# Patient Record
Sex: MALE | Race: WHITE | Employment: OTHER | URBAN - METROPOLITAN AREA
[De-identification: names, ages, dates, MRNs, and addresses within clinical notes are randomized per-mention and may not be internally consistent; named-entity substitution may affect disease eponyms.]

---

## 2018-06-04 PROBLEM — C79.51 OSSEOUS METASTASIS (HCC): Status: ACTIVE | Noted: 2018-06-04

## 2018-06-04 PROBLEM — R59.0 PELVIC LYMPHADENOPATHY: Status: ACTIVE | Noted: 2018-06-04

## 2018-06-04 PROBLEM — C61 HORMONE REFRACTORY PROSTATE CANCER (HCC): Status: ACTIVE | Noted: 2018-06-04

## 2018-06-04 PROBLEM — C61 MALIGNANT NEOPLASM OF PROSTATE (HCC): Status: ACTIVE | Noted: 2018-06-04

## 2018-06-13 ENCOUNTER — APPOINTMENT (OUTPATIENT)
Dept: GENERAL RADIOLOGY | Age: 83
DRG: 872 | End: 2018-06-13
Attending: EMERGENCY MEDICINE
Payer: MEDICARE

## 2018-06-13 ENCOUNTER — HOSPITAL ENCOUNTER (EMERGENCY)
Age: 83
Discharge: NURSING, REHAB OR LONG-TERM CARE | DRG: 872 | End: 2018-06-14
Attending: EMERGENCY MEDICINE
Payer: MEDICARE

## 2018-06-13 ENCOUNTER — APPOINTMENT (OUTPATIENT)
Dept: CT IMAGING | Age: 83
DRG: 872 | End: 2018-06-13
Attending: EMERGENCY MEDICINE
Payer: MEDICARE

## 2018-06-13 VITALS
BODY MASS INDEX: 23.71 KG/M2 | TEMPERATURE: 98.4 F | HEART RATE: 100 BPM | RESPIRATION RATE: 16 BRPM | SYSTOLIC BLOOD PRESSURE: 155 MMHG | OXYGEN SATURATION: 93 % | WEIGHT: 170 LBS | DIASTOLIC BLOOD PRESSURE: 83 MMHG

## 2018-06-13 DIAGNOSIS — M25.551 CHRONIC RIGHT HIP PAIN: Primary | ICD-10-CM

## 2018-06-13 DIAGNOSIS — G89.29 CHRONIC RIGHT HIP PAIN: Primary | ICD-10-CM

## 2018-06-13 DIAGNOSIS — R33.8 ACUTE URINARY RETENTION: ICD-10-CM

## 2018-06-13 DIAGNOSIS — K80.20 CHOLELITHIASIS WITHOUT CHOLECYSTITIS: ICD-10-CM

## 2018-06-13 DIAGNOSIS — C61 PROSTATE CANCER (HCC): ICD-10-CM
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
## 2018-06-13 DIAGNOSIS — K40.90 RIGHT INGUINAL HERNIA: ICD-10-CM

## 2018-06-13 LAB
BASOPHILS # BLD: 0 K/UL (ref 0–0.06)
BASOPHILS NFR BLD: 0 % (ref 0–2)
DIFFERENTIAL METHOD BLD: ABNORMAL
EOSINOPHIL # BLD: 0 K/UL (ref 0–0.4)
EOSINOPHIL NFR BLD: 0 % (ref 0–5)
ERYTHROCYTE [DISTWIDTH] IN BLOOD BY AUTOMATED COUNT: 12.9 % (ref 11.6–14.5)
HCT VFR BLD AUTO: 41.1 % (ref 36–48)
HGB BLD-MCNC: 14.4 G/DL (ref 13–16)
LYMPHOCYTES # BLD: 1.3 K/UL (ref 0.9–3.6)
LYMPHOCYTES NFR BLD: 14 % (ref 21–52)
MCH RBC QN AUTO: 32.7 PG (ref 24–34)
MCHC RBC AUTO-ENTMCNC: 35 G/DL (ref 31–37)
MCV RBC AUTO: 93.4 FL (ref 74–97)
MONOCYTES # BLD: 0.6 K/UL (ref 0.05–1.2)
MONOCYTES NFR BLD: 7 % (ref 3–10)
NEUTS SEG # BLD: 6.8 K/UL (ref 1.8–8)
NEUTS SEG NFR BLD: 79 % (ref 40–73)
PLATELET # BLD AUTO: 184 K/UL (ref 135–420)
PMV BLD AUTO: 9.7 FL (ref 9.2–11.8)
RBC # BLD AUTO: 4.4 M/UL (ref 4.7–5.5)
WBC # BLD AUTO: 8.8 K/UL (ref 4.6–13.2)

## 2018-06-13 PROCEDURE — 77030034850

## 2018-06-13 PROCEDURE — 96374 THER/PROPH/DIAG INJ IV PUSH: CPT

## 2018-06-13 PROCEDURE — 87077 CULTURE AEROBIC IDENTIFY: CPT | Performed by: EMERGENCY MEDICINE

## 2018-06-13 PROCEDURE — 73502 X-RAY EXAM HIP UNI 2-3 VIEWS: CPT

## 2018-06-13 PROCEDURE — 74011636320 HC RX REV CODE- 636/320: Performed by: EMERGENCY MEDICINE

## 2018-06-13 PROCEDURE — 87086 URINE CULTURE/COLONY COUNT: CPT | Performed by: EMERGENCY MEDICINE

## 2018-06-13 PROCEDURE — 74011250637 HC RX REV CODE- 250/637: Performed by: EMERGENCY MEDICINE

## 2018-06-13 PROCEDURE — 74177 CT ABD & PELVIS W/CONTRAST: CPT

## 2018-06-13 PROCEDURE — 51702 INSERT TEMP BLADDER CATH: CPT

## 2018-06-13 PROCEDURE — 85025 COMPLETE CBC W/AUTO DIFF WBC: CPT | Performed by: EMERGENCY MEDICINE

## 2018-06-13 PROCEDURE — 96375 TX/PRO/DX INJ NEW DRUG ADDON: CPT

## 2018-06-13 PROCEDURE — 99284 EMERGENCY DEPT VISIT MOD MDM: CPT

## 2018-06-13 PROCEDURE — 87186 SC STD MICRODIL/AGAR DIL: CPT | Performed by: EMERGENCY MEDICINE

## 2018-06-13 PROCEDURE — 74011000250 HC RX REV CODE- 250: Performed by: EMERGENCY MEDICINE

## 2018-06-13 RX ORDER — LIDOCAINE HYDROCHLORIDE 20 MG/ML
JELLY TOPICAL ONCE
Status: COMPLETED | OUTPATIENT
Start: 2018-06-13 | End: 2018-06-13

## 2018-06-13 RX ORDER — ACETAMINOPHEN 500 MG
1000 TABLET ORAL
Status: COMPLETED | OUTPATIENT
Start: 2018-06-13 | End: 2018-06-13

## 2018-06-13 RX ORDER — ACETAMINOPHEN 500 MG
1000 TABLET ORAL
Qty: 50 TAB | Refills: 0 | Status: SHIPPED | OUTPATIENT
Start: 2018-06-13

## 2018-06-13 RX ADMIN — IOPAMIDOL 91 ML: 612 INJECTION, SOLUTION INTRAVENOUS at 22:37

## 2018-06-13 RX ADMIN — ACETAMINOPHEN 1000 MG: 500 TABLET, FILM COATED ORAL at 23:36

## 2018-06-13 RX ADMIN — LIDOCAINE HYDROCHLORIDE: 20 JELLY TOPICAL at 22:04

## 2018-06-14 ENCOUNTER — HOSPITAL ENCOUNTER (OUTPATIENT)
Dept: NUCLEAR MEDICINE | Age: 83
Discharge: HOME | DRG: 872 | End: 2018-06-14
Attending: UROLOGY
Payer: MEDICARE

## 2018-06-14 DIAGNOSIS — C61 HORMONE REFRACTORY PROSTATE CANCER (HCC): ICD-10-CM

## 2018-06-14 DIAGNOSIS — R59.0 PELVIC LYMPHADENOPATHY: ICD-10-CM

## 2018-06-14 DIAGNOSIS — C79.51 OSSEOUS METASTASIS (HCC): ICD-10-CM

## 2018-06-14 DIAGNOSIS — R35.1 NOCTURIA: ICD-10-CM

## 2018-06-14 DIAGNOSIS — N40.0 BENIGN PROSTATIC HYPERPLASIA, UNSPECIFIED WHETHER LOWER URINARY TRACT SYMPTOMS PRESENT: ICD-10-CM

## 2018-06-14 DIAGNOSIS — C61 MALIGNANT NEOPLASM OF PROSTATE (HCC): ICD-10-CM

## 2018-06-14 DIAGNOSIS — R33.9 URINARY RETENTION: ICD-10-CM

## 2018-06-14 PROCEDURE — A9503 TC99M MEDRONATE: HCPCS

## 2018-06-14 NOTE — ED PROVIDER NOTES
HPI Comments: Jaci De Oliveira is a 80 y.o. Male with h/o prostate cancer, who slipped from chair to floor yesterday with c/o worsening pain in right hip which has been hurting for last 4 months. Denies any head injury, nvd, fcs, cough. Also has not been able to urinate today. Was seen at urologist yesterday who rec straight cath but has been unable to do so. Pain in hip better at rest, worse with ambulation. Increased diff walking. Is also due for ct and bone scan tomorrow here. The history is provided by the patient (son). Past Medical History:   Diagnosis Date    Benign prostatic hyperplasia without lower urinary tract symptoms     BPH without obstruction/lower urinary tract symptoms     Hormone refractory prostate cancer (HCC)     Hypercholesterolemia     Hypertrophy of prostate without urinary obstruction     Lower urinary tract symptoms (LUTS)     Malignant neoplasm of prostate ([facility] [address]     [id number] CRPC Adenocarcinoma of the Prostate, dx 4/2012 paola 4+3, presenting PSA 8.9ng/mL.  Nocturia     Osseous metastasis (HCC)     Pelvic lymphadenopathy     Personal history of colonic polyps     Urinary retention     Vitamin D deficiency        Past Surgical History:   Procedure Laterality Date    ENDOSCOPY, COLON, DIAGNOSTIC  2006/2011    HX APPENDECTOMY      HX CATARACT REMOVAL  03/03/2015, 2006, 2011    HX TONSILLECTOMY           Family History:   Problem Relation Age of Onset    Hypertension Mother        Social History     Social History    Marital status:      Spouse name: N/A    Number of children: N/A    Years of education: N/A     Occupational History    Not on file.      Social History Main Topics    Smoking status: Never Smoker    Smokeless tobacco: Never Used    Alcohol use No    Drug use: No    Sexual activity: Not on file     Other Topics Concern    Not on file     Social History Narrative         ALLERGIES: Alendronate    Review of Systems   Constitutional: Negative for fever. HENT: Negative for sore throat and trouble swallowing. Eyes: Negative for visual disturbance. Respiratory: Negative for cough and shortness of breath. Cardiovascular: Negative for chest pain. Gastrointestinal: Positive for abdominal distention. Negative for abdominal pain. Endocrine: Negative for polyuria. Genitourinary: Positive for difficulty urinating. Negative for scrotal swelling. Musculoskeletal: Positive for arthralgias and gait problem. Skin: Negative for rash. Allergic/Immunologic: Negative for immunocompromised state. Neurological: Negative for syncope. Psychiatric/Behavioral: Negative for sleep disturbance. Vitals:    06/13/18 2124   BP: 155/83   Pulse: 100   Resp: 16   Temp: 98.4 °F (36.9 °C)   SpO2: 93%   Weight: 77.1 kg (170 lb)            Physical Exam   Constitutional: He is oriented to person, place, and time. Non-toxic appearance. He does not appear ill. No distress. HENT:   Head: Normocephalic and atraumatic. Right Ear: External ear normal.   Left Ear: External ear normal.   Nose: Nose normal.   Mouth/Throat: Oropharynx is clear and moist. No oropharyngeal exudate. Eyes: Conjunctivae are normal.   Neck: Normal range of motion. Cardiovascular: Normal rate, regular rhythm, normal heart sounds and intact distal pulses. Pulmonary/Chest: Effort normal and breath sounds normal. No respiratory distress. Abdominal: Soft. He exhibits distension. There is no tenderness. Genitourinary: Penis normal.   Musculoskeletal: He exhibits no edema. Right hip: He exhibits decreased range of motion, tenderness and bony tenderness (no leg shortening). He exhibits no swelling, no crepitus, no deformity and no laceration. Neurological: He is alert and oriented to person, place, and time. Skin: Skin is warm and dry. He is not diaphoretic. Psychiatric: His behavior is normal.   Nursing note and vitals reviewed.        MDM      ED Course Procedures  Vitals:  Patient Vitals for the past 12 hrs:   Temp Pulse Resp BP SpO2   06/13/18 2124 98.4 °F (36.9 °C) 100 16 155/83 93 %         Medications ordered:   Medications   acetaminophen (TYLENOL) tablet 1,000 mg (not administered)   lidocaine (URO-JET) 2 % jelly ( Urethral Given 6/13/18 2204)   iopamidol (ISOVUE 300) 61 % contrast injection 100 mL (91 mL IntraVENous Given 6/13/18 2237)         Lab findings:  Recent Results (from the past 12 hour(s))   CBC WITH AUTOMATED DIFF    Collection Time: 06/13/18 10:20 PM   Result Value Ref Range    WBC 8.8 4.6 - 13.2 K/uL    RBC 4.40 (L) 4.70 - 5.50 M/uL    HGB 14.4 13.0 - 16.0 g/dL    HCT 41.1 36.0 - 48.0 %    MCV 93.4 74.0 - 97.0 FL    MCH 32.7 24.0 - 34.0 PG    MCHC 35.0 31.0 - 37.0 g/dL    RDW 12.9 11.6 - 14.5 %    PLATELET 364 417 - 189 K/uL    MPV 9.7 9.2 - 11.8 FL    NEUTROPHILS 79 (H) 40 - 73 %    LYMPHOCYTES 14 (L) 21 - 52 %    MONOCYTES 7 3 - 10 %    EOSINOPHILS 0 0 - 5 %    BASOPHILS 0 0 - 2 %    ABS. NEUTROPHILS 6.8 1.8 - 8.0 K/UL    ABS. LYMPHOCYTES 1.3 0.9 - 3.6 K/UL    ABS. MONOCYTES 0.6 0.05 - 1.2 K/UL    ABS. EOSINOPHILS 0.0 0.0 - 0.4 K/UL    ABS. BASOPHILS 0.0 0.0 - 0.06 K/UL    DF AUTOMATED         EKG interpretation by ED Physician:      X-Ray, CT or other radiology findings or impressions:  CT ABD PELV W CONT   Final Result      XR HIP RT W OR WO PELV 2-3 VWS    (Results Pending)       Progress notes, Consult notes or additional Procedure notes:   Reviewed above results. Put large amount of urine out. Sent for culture  Doubt need for admission. I have discussed with patient and/or family/sig other the results, interpretation of any imaging if performed, suspected diagnosis and treatment plan to include instructions regarding the diagnoses listed to which understanding was expressed with all questions answered      Reevaluation of patient:   stable    Disposition:  Diagnosis:   1. Chronic right hip pain    2.  Fall from chair, initial encounter    3. Acute urinary retention    4. Prostate cancer ([facility] [address]    5. Cholelithiasis without cholecystitis    6. Right inguinal hernia        Disposition: home      Follow-up Information     Follow up With Details Comments Contact Info    follow up with your urologist as soon as possible               Patient's Medications   Start Taking    ACETAMINOPHEN (TYLENOL EXTRA STRENGTH) 500 MG TABLET    Take 2 Tabs by mouth every six (6) hours as needed for Pain. Continue Taking    ASPIRIN DELAYED-RELEASE 81 MG TABLET    Take  by mouth daily. CALCIUM CARBONATE-VITAMIN D3 600 MG (1,500 MG)-800 UNIT CHEW    Take 1 Tab by Mouth Once a Day. CHOLECALCIFEROL (VITAMIN D3) 1,000 UNIT TABLET    1,000 Units. EZETIMIBE (ZETIA) 10 MG TABLET    Take  by mouth. MULTIVITAMINS-MINERALS-LUTEIN (MEN'S CENTRUM SILVER WITH LUTEIN) TAB TABLET    Take 1 Tab by Mouth Once a Day. SILODOSIN (RAPAFLO) 8 MG CAPSULE    Take 1 Cap by mouth daily (with dinner). Indications: benign prostatic hyperplasia with lower urinary tract sx    ZINC MTH/COPPER/SAW PALM/GNSG (PROSTATE HEALTH FORMULA PO)    Take  by mouth.    These Medications have changed    No medications on file   Stop Taking    No medications on file

## 2018-06-14 NOTE — ED TRIAGE NOTES
Pt arrived by EMS with chief c/o fall yesterday and chronic right hip pain X4 months.  Patient states his right hip gave out on him and he lowered himself to the ground

## 2018-06-14 NOTE — ED NOTES
I have reviewed discharge instructions with the patient. The patient verbalized understanding. Patient armband removed and shredded. 1 prescription given. All questions answered. Pt d/c'd to home awake, alert and in NAD. Pt transported to home via [address] [place] [address]

## 2018-06-17 ENCOUNTER — APPOINTMENT (OUTPATIENT)
Dept: GENERAL RADIOLOGY | Age: 83
DRG: 872 | End: 2018-06-17
Attending: EMERGENCY MEDICINE
Payer: MEDICARE

## 2018-06-17 ENCOUNTER — HOSPITAL ENCOUNTER (INPATIENT)
Age: 83
LOS: 1 days | Discharge: ANOTHER HOSPITAL | DRG: 872 | End: 2018-06-17
Attending: EMERGENCY MEDICINE | Admitting: HOSPITALIST
Payer: MEDICARE

## 2018-06-17 ENCOUNTER — APPOINTMENT (OUTPATIENT)
Dept: CT IMAGING | Age: 83
DRG: 872 | End: 2018-06-17
Attending: EMERGENCY MEDICINE
Payer: MEDICARE

## 2018-06-17 VITALS
HEIGHT: 71 IN | WEIGHT: 170 LBS | DIASTOLIC BLOOD PRESSURE: 72 MMHG | RESPIRATION RATE: 17 BRPM | HEART RATE: 87 BPM | SYSTOLIC BLOOD PRESSURE: 109 MMHG | TEMPERATURE: 97.8 F | BODY MASS INDEX: 23.8 KG/M2 | OXYGEN SATURATION: 94 %

## 2018-06-17 DIAGNOSIS — A41.9 SEPSIS, DUE TO UNSPECIFIED ORGANISM: Primary | ICD-10-CM

## 2018-06-17 DIAGNOSIS — N30.01 ACUTE CYSTITIS WITH HEMATURIA: ICD-10-CM

## 2018-06-17 LAB
ALBUMIN SERPL-MCNC: 3.2 G/DL (ref 3.4–5)
ALBUMIN/GLOB SERPL: 1.1 {RATIO} (ref 0.8–1.7)
ALP SERPL-CCNC: 143 U/L (ref 45–117)
ALT SERPL-CCNC: 116 U/L (ref 16–61)
ANION GAP BLD CALC-SCNC: 20 MMOL/L (ref 10–20)
ANION GAP SERPL CALC-SCNC: 11 MMOL/L (ref 3–18)
APPEARANCE UR: ABNORMAL
AST SERPL-CCNC: 88 U/L (ref 15–37)
BACTERIA SPEC CULT: ABNORMAL
BACTERIA URNS QL MICRO: ABNORMAL /HPF
BASOPHILS # BLD: 0 K/UL (ref 0–0.06)
BASOPHILS NFR BLD: 0 % (ref 0–2)
BILIRUB SERPL-MCNC: 2.2 MG/DL (ref 0.2–1)
BILIRUB UR QL: NEGATIVE
BUN BLD-MCNC: 26 MG/DL (ref 7–18)
BUN SERPL-MCNC: 27 MG/DL (ref 7–18)
BUN/CREAT SERPL: 22 (ref 12–20)
CA-I BLD-MCNC: 1.08 MMOL/L (ref 1.12–1.32)
CALCIUM SERPL-MCNC: 8.3 MG/DL (ref 8.5–10.1)
CHLORIDE BLD-SCNC: 97 MMOL/L (ref 100–108)
CHLORIDE SERPL-SCNC: 100 MMOL/L (ref 100–108)
CO2 BLD-SCNC: 22 MMOL/L (ref 19–24)
CO2 SERPL-SCNC: 24 MMOL/L (ref 21–32)
COLOR UR: YELLOW
CREAT SERPL-MCNC: 1.22 MG/DL (ref 0.6–1.3)
CREAT UR-MCNC: 0.9 MG/DL (ref 0.6–1.3)
DIFFERENTIAL METHOD BLD: ABNORMAL
EOSINOPHIL # BLD: 0 K/UL (ref 0–0.4)
EOSINOPHIL NFR BLD: 0 % (ref 0–5)
ERYTHROCYTE [DISTWIDTH] IN BLOOD BY AUTOMATED COUNT: 13 % (ref 11.6–14.5)
GLOBULIN SER CALC-MCNC: 3 G/DL (ref 2–4)
GLUCOSE BLD STRIP.AUTO-MCNC: 148 MG/DL (ref 74–106)
GLUCOSE SERPL-MCNC: 141 MG/DL (ref 74–99)
GLUCOSE UR STRIP.AUTO-MCNC: NEGATIVE MG/DL
HCT VFR BLD AUTO: 43.9 % (ref 36–48)
HCT VFR BLD CALC: 47 % (ref 36–49)
HGB BLD-MCNC: 15.7 G/DL (ref 13–16)
HGB BLD-MCNC: 16 G/DL (ref 12–16)
HGB UR QL STRIP: ABNORMAL
KETONES UR QL STRIP.AUTO: NEGATIVE MG/DL
LACTATE BLD-SCNC: 2.7 MMOL/L (ref 0.4–2)
LACTATE BLD-SCNC: 3.6 MMOL/L (ref 0.4–2)
LEUKOCYTE ESTERASE UR QL STRIP.AUTO: ABNORMAL
LYMPHOCYTES # BLD: 0.2 K/UL (ref 0.9–3.6)
LYMPHOCYTES NFR BLD: 3 % (ref 21–52)
MCH RBC QN AUTO: 33 PG (ref 24–34)
MCHC RBC AUTO-ENTMCNC: 35.8 G/DL (ref 31–37)
MCV RBC AUTO: 92.2 FL (ref 74–97)
MONOCYTES # BLD: 0.3 K/UL (ref 0.05–1.2)
MONOCYTES NFR BLD: 4 % (ref 3–10)
NEUTS SEG # BLD: 8.4 K/UL (ref 1.8–8)
NEUTS SEG NFR BLD: 93 % (ref 40–73)
NITRITE UR QL STRIP.AUTO: POSITIVE
PH UR STRIP: 8.5 [PH] (ref 5–8)
PLATELET # BLD AUTO: 191 K/UL (ref 135–420)
PMV BLD AUTO: 9.6 FL (ref 9.2–11.8)
POTASSIUM BLD-SCNC: 4.1 MMOL/L (ref 3.5–5.5)
POTASSIUM SERPL-SCNC: 4.2 MMOL/L (ref 3.5–5.5)
PROT SERPL-MCNC: 6.2 G/DL (ref 6.4–8.2)
PROT UR STRIP-MCNC: 300 MG/DL
RBC # BLD AUTO: 4.76 M/UL (ref 4.7–5.5)
RBC #/AREA URNS HPF: ABNORMAL /HPF (ref 0–5)
SERVICE CMNT-IMP: ABNORMAL
SODIUM BLD-SCNC: 133 MMOL/L (ref 136–145)
SODIUM SERPL-SCNC: 135 MMOL/L (ref 136–145)
SP GR UR REFRACTOMETRY: 1.01 (ref 1–1.03)
UROBILINOGEN UR QL STRIP.AUTO: 1 EU/DL (ref 0.2–1)
WBC # BLD AUTO: 9 K/UL (ref 4.6–13.2)
WBC URNS QL MICRO: ABNORMAL /HPF (ref 0–4)

## 2018-06-17 PROCEDURE — 74011250636 HC RX REV CODE- 250/636: Performed by: EMERGENCY MEDICINE

## 2018-06-17 PROCEDURE — 74177 CT ABD & PELVIS W/CONTRAST: CPT

## 2018-06-17 PROCEDURE — 96367 TX/PROPH/DG ADDL SEQ IV INF: CPT

## 2018-06-17 PROCEDURE — 96365 THER/PROPH/DIAG IV INF INIT: CPT

## 2018-06-17 PROCEDURE — 65660000000 HC RM CCU STEPDOWN

## 2018-06-17 PROCEDURE — 87040 BLOOD CULTURE FOR BACTERIA: CPT | Performed by: EMERGENCY MEDICINE

## 2018-06-17 PROCEDURE — 74011636320 HC RX REV CODE- 636/320: Performed by: EMERGENCY MEDICINE

## 2018-06-17 PROCEDURE — 87086 URINE CULTURE/COLONY COUNT: CPT | Performed by: EMERGENCY MEDICINE

## 2018-06-17 PROCEDURE — 87077 CULTURE AEROBIC IDENTIFY: CPT | Performed by: EMERGENCY MEDICINE

## 2018-06-17 PROCEDURE — 81001 URINALYSIS AUTO W/SCOPE: CPT | Performed by: EMERGENCY MEDICINE

## 2018-06-17 PROCEDURE — 74011000258 HC RX REV CODE- 258: Performed by: EMERGENCY MEDICINE

## 2018-06-17 PROCEDURE — 74011250637 HC RX REV CODE- 250/637: Performed by: EMERGENCY MEDICINE

## 2018-06-17 PROCEDURE — 71045 X-RAY EXAM CHEST 1 VIEW: CPT

## 2018-06-17 PROCEDURE — 96361 HYDRATE IV INFUSION ADD-ON: CPT

## 2018-06-17 PROCEDURE — 80053 COMPREHEN METABOLIC PANEL: CPT | Performed by: EMERGENCY MEDICINE

## 2018-06-17 PROCEDURE — 99285 EMERGENCY DEPT VISIT HI MDM: CPT

## 2018-06-17 PROCEDURE — 80047 BASIC METABLC PNL IONIZED CA: CPT

## 2018-06-17 PROCEDURE — 93005 ELECTROCARDIOGRAM TRACING: CPT

## 2018-06-17 PROCEDURE — 85025 COMPLETE CBC W/AUTO DIFF WBC: CPT | Performed by: EMERGENCY MEDICINE

## 2018-06-17 PROCEDURE — 83605 ASSAY OF LACTIC ACID: CPT

## 2018-06-17 PROCEDURE — 87186 SC STD MICRODIL/AGAR DIL: CPT | Performed by: EMERGENCY MEDICINE

## 2018-06-17 PROCEDURE — 77030005514 HC CATH URETH FOL14 BARD -A

## 2018-06-17 RX ORDER — SODIUM CHLORIDE 0.9 % (FLUSH) 0.9 %
5-10 SYRINGE (ML) INJECTION AS NEEDED
Status: DISCONTINUED | OUTPATIENT
Start: 2018-06-17 | End: 2018-06-17

## 2018-06-17 RX ORDER — ONDANSETRON 2 MG/ML
4 INJECTION INTRAMUSCULAR; INTRAVENOUS
Status: DISCONTINUED | OUTPATIENT
Start: 2018-06-17 | End: 2018-06-17

## 2018-06-17 RX ORDER — EZETIMIBE 10 MG/1
10 TABLET ORAL DAILY
Status: CANCELLED | OUTPATIENT
Start: 2018-06-17

## 2018-06-17 RX ORDER — LEVOFLOXACIN 5 MG/ML
500 INJECTION, SOLUTION INTRAVENOUS EVERY 24 HOURS
Status: DISCONTINUED | OUTPATIENT
Start: 2018-06-17 | End: 2018-06-17

## 2018-06-17 RX ORDER — ACETAMINOPHEN 500 MG
1000 TABLET ORAL
Status: COMPLETED | OUTPATIENT
Start: 2018-06-17 | End: 2018-06-17

## 2018-06-17 RX ORDER — SODIUM CHLORIDE 9 MG/ML
100 INJECTION, SOLUTION INTRAVENOUS CONTINUOUS
Status: CANCELLED | OUTPATIENT
Start: 2018-06-17

## 2018-06-17 RX ADMIN — LEVOFLOXACIN 500 MG: 5 INJECTION, SOLUTION INTRAVENOUS at 15:53

## 2018-06-17 RX ADMIN — CEFEPIME HYDROCHLORIDE 2 G: 2 INJECTION, POWDER, FOR SOLUTION INTRAVENOUS at 14:33

## 2018-06-17 RX ADMIN — IOPAMIDOL 96 ML: 612 INJECTION, SOLUTION INTRAVENOUS at 15:01

## 2018-06-17 RX ADMIN — ACETAMINOPHEN 1000 MG: 500 TABLET, FILM COATED ORAL at 14:03

## 2018-06-17 RX ADMIN — SODIUM CHLORIDE 2000 ML: 900 INJECTION, SOLUTION INTRAVENOUS at 14:08

## 2018-06-17 RX ADMIN — SODIUM CHLORIDE 500 ML: 900 INJECTION, SOLUTION INTRAVENOUS at 16:50

## 2018-06-17 NOTE — ED NOTES
Diaper soaked with foul smelling urine, pus and feces when switching out obrges  Once old borges removed pt immediatly starts urinating

## 2018-06-17 NOTE — PROGRESS NOTES
I was called earlier by the ED physician to admit pt for Sepsis secondary to UTI and agreed with orders placed. I later got a return call from the ED physician that CT Abdomen and Pelvis revealed bladder rupture hence pt was going to be transferred to [facility] for further management. Admit orders previously entered were discontinued.

## 2018-06-17 NOTE — ED PROVIDER NOTES
EMERGENCY DEPARTMENT HISTORY AND PHYSICAL EXAM    1:22 PM      Date: 6/17/2018  Patient Name: Christopher Orlando    History of Presenting Illness     Chief Complaint   Patient presents with    Fever    Urinary Catheter Problem         History Provided By: Patient and EMS    Chief Complaint: Catheter issue  Duration:  Few hours PTA  Timing:  Acute  Location: n/a  Quality: Leaking  Severity: N/A  Modifying Factors: No modifying or aggravating factors were reported. Associated Symptoms:  Fever, diaphoresis      Additional History (Context): Christopher Orlando is a 80 y.o. male with hypercholesterolemia and dementia who presents with an acute leaking catheter associated with diaphoresis and fever with onset a few hours PTA. EMS states he was found in a puddle of urine in [facility], an independent living facility. Pt reports borges was placed for urinary retention 2 weeks ago. Patient denies SOB and abd pain. States he had a CT scan on his last visit where two gallstones and a hernia were found. No modifying or aggravating factors were reported. No other concerns or symptoms at this time. PCP: Rob Jimenes NP    Current Outpatient Prescriptions   Medication Sig Dispense Refill    silodosin (RAPAFLO) 8 mg capsule Take 1 Cap by mouth daily (with dinner). Indications: benign prostatic hyperplasia with lower urinary tract sx 90 Cap 3    acetaminophen (TYLENOL EXTRA STRENGTH) 500 mg tablet Take 2 Tabs by mouth every six (6) hours as needed for Pain. 50 Tab 0    Zinc Mth/Copper/Saw Palm/Gnsg (PROSTATE HEALTH FORMULA PO) Take  by mouth.  calcium carbonate-vitamin D3 600 mg (1,500 mg)-800 unit chew Take 1 Tab by Mouth Once a Day.  cholecalciferol (VITAMIN D3) 1,000 unit tablet 1,000 Units.  multivitamins-minerals-lutein (MEN'S CENTRUM SILVER WITH LUTEIN) tab tablet Take 1 Tab by Mouth Once a Day.  aspirin delayed-release 81 mg tablet Take  by mouth daily.       ezetimibe (ZETIA) 10 mg tablet Take  by mouth. Past History     Past Medical History:  Past Medical History:   Diagnosis Date    Benign prostatic hyperplasia without lower urinary tract symptoms     BPH without obstruction/lower urinary tract symptoms     Hormone refractory prostate cancer ([facility] [address]     Hypercholesterolemia     Hypertrophy of prostate without urinary obstruction     Lower urinary tract symptoms (LUTS)     Malignant neoplasm of prostate ([facility] [address]     [id number] CRPC Adenocarcinoma of the Prostate, dx 4/2012 paola 4+3, presenting PSA 8.9ng/mL.  Nocturia     Osseous metastasis (HCC)     Pelvic lymphadenopathy     Personal history of colonic polyps     Urinary retention     Vitamin D deficiency        Past Surgical History:  Past Surgical History:   Procedure Laterality Date    ENDOSCOPY, COLON, DIAGNOSTIC  2006/2011    HX APPENDECTOMY      HX CATARACT REMOVAL  03/03/2015, 2006, 2011    HX TONSILLECTOMY         Family History:  Family History   Problem Relation Age of Onset    Hypertension Mother        Social History:  Social History   Substance Use Topics    Smoking status: Never Smoker    Smokeless tobacco: Never Used    Alcohol use No       Allergies: Allergies   Allergen Reactions    Alendronate Other (comments)         Review of Systems     Review of Systems   Constitutional: Positive for diaphoresis and fever. Negative for chills. HENT: Negative for ear pain and sore throat. Eyes: Negative for pain and visual disturbance. Respiratory: Negative for cough and shortness of breath. Cardiovascular: Negative for chest pain and palpitations. Gastrointestinal: Negative for abdominal pain, diarrhea, nausea and vomiting. Genitourinary: Negative for flank pain. Positive for leaking catheter   Musculoskeletal: Negative for back pain and neck pain. Neurological: Negative for syncope and headaches. Psychiatric/Behavioral: Negative for agitation. The patient is not nervous/anxious. Physical Exam     Visit Vitals    /74    Pulse 92    Temp 97.8 °F (36.6 °C)    Resp 20    Ht 5' 11\" (1.803 m)    Wt 77.1 kg (170 lb)    SpO2 93%    BMI 23.71 kg/m2       Physical Exam   Constitutional: He is oriented to person, place, and time. He appears well-developed and well-nourished. HENT:   Head: Normocephalic and atraumatic. Mouth/Throat: Oropharynx is clear and moist.   Eyes: Pupils are equal, round, and reactive to light. No scleral icterus. Neck: Neck supple. No tracheal deviation present. Cardiovascular: Regular rhythm and intact distal pulses. Tachycardia present. No murmur heard. Pulmonary/Chest: Effort normal and breath sounds normal. No respiratory distress. Abdominal: Soft. There is no tenderness. Genitourinary:   Genitourinary Comments: Leaking malodorous Garcia in place   Musculoskeletal: Normal range of motion. He exhibits no edema or deformity. Neurological: He is alert and oriented to person, place, and time. No gross neuro deficit   Skin: Skin is warm. No rash noted. He is diaphoretic. Psychiatric: He has a normal mood and affect. Nursing note and vitals reviewed. Diagnostic Study Results     Labs -  Labs Reviewed   URINALYSIS W/ RFLX MICROSCOPIC - Abnormal; Notable for the following:        Result Value    pH (UA) 8.5 (*)     Protein 300 (*)     Blood LARGE (*)     Nitrites POSITIVE (*)     Leukocyte Esterase LARGE (*)     All other components within normal limits   METABOLIC PANEL, COMPREHENSIVE - Abnormal; Notable for the following:     Sodium 135 (*)     Glucose 141 (*)     BUN 27 (*)     BUN/Creatinine ratio 22 (*)     GFR est non-AA 56 (*)     Calcium 8.3 (*)     Bilirubin, total 2.2 (*)     ALT (SGPT) 116 (*)     AST (SGOT) 88 (*)     Alk.  phosphatase 143 (*)     Protein, total 6.2 (*)     Albumin 3.2 (*)     All other components within normal limits   CBC WITH AUTOMATED DIFF - Abnormal; Notable for the following:     NEUTROPHILS 93 (*)     LYMPHOCYTES 3 (*)     ABS. NEUTROPHILS 8.4 (*)     ABS. LYMPHOCYTES 0.2 (*)     All other components within normal limits   URINE MICROSCOPIC ONLY - Abnormal; Notable for the following:     Bacteria 4+ (*)     All other components within normal limits   POC CHEM8 - Abnormal; Notable for the following:     Glucose,  (*)     BUN, POC 26 (*)     Sodium,  (*)     Calcium, ionized (POC) 1.08 (*)     Chloride, POC 97 (*)     All other components within normal limits   POC LACTIC ACID - Abnormal; Notable for the following:     Lactic Acid (POC) 3.6 (*)     All other components within normal limits   POC LACTIC ACID - Abnormal; Notable for the following:     Lactic Acid (POC) 2.7 (*)     All other components within normal limits   CULTURE, BLOOD   CULTURE, BLOOD   CULTURE, URINE       Radiologic Studies -   CT ABD PELV W CONT   Final Result      XR CHEST PORT   Final Result            Medical Decision Making   I am the first provider for this patient. I reviewed the vital signs, available nursing notes, past medical history, past surgical history, family history and social history. Vital Signs-Reviewed the patient's vital signs. Pulse Oximetry Analysis -  92% on room air     Records Reviewed: Nursing Notes and Old Medical Records (Time of Review: 1:22 PM)    ED Course: Progress Notes, Reevaluation, and Consults:  ED Course   Value Comment By Time    Nitrite positive UTI, pt tx with broad spectrum antibiotics directed for catheter associated UTI.  John Blanton  06/17 1413   GFR est AA: >60 (Reviewed) John Harvinder  06/17 1519    Irregular bladder wall with free fluid in pelvis with concern of intra and extraperitoneal rupture, recommends CT urogram. John Blanton DO 06/17 1621    Repeat lactate 2.69 John Blanton  06/17 1656     Consult:  Discussed care with Dr. Jeremy Ricardo (Urologist) Standard discussion; including history of patients chief complaint, available diagnostic results, and treatment course. Dr. Jeremy Ricardo asked to perform CT CYSTOGRAM to evaluate for possible bladder rupture. 4:29 PM, 6/17/2018     Consult:  Discussed care with the radiologist. Standard discussion; including history of patients chief complaint, available diagnostic results, and treatment course. States the [facility] cannot perform a CT cystogram. I am going to call Dr. Jeremy Ricardo back to discuss further care. CT cystogram needed for definitive diagnosis. 4:35 PM, 6/17/2018       Provider Notes (Medical Decision Making):     DDX: Sepsis, Urosepsis, Pyelonephritis, Hydronephrosis, gastroenteritis, cholecystis, electrolyte abnormality, dehydration, bladder CA, bladder rupture     80 y.o. male with noted past medical history who presented with Garcia malfunction with abnormal vitals including tachycardia and fever, concerned for urosepsis. Garcia is purulent and leaking, replaced in the ED. Started on broad spectrum abx, 30 mg/kg fluid BOLUS. The differential above was considered. The patient was given:  Medications   acetaminophen (TYLENOL) tablet 1,000 mg (1,000 mg Oral Given 6/17/18 1403)   sodium chloride 0.9 % bolus infusion 2,000 mL (0 mL IntraVENous IV Completed 6/17/18 1613)   sodium chloride 0.9 % bolus infusion 500 mL (500 mL IntraVENous New Bag 6/17/18 1650)   iopamidol (ISOVUE 300) 61 % contrast injection 100 mL (96 mL IntraVENous Given 6/17/18 1501)     See ED Course    Diagnostics notable for nitrite positive UTI, normal creatinine and WBC with slightly elevated BUN. Pt remained hemodynamically stable in the ED with lactate improvement, resting comfortably, non-tender abdomen and updated on results and plan for transfer. Pt in agreement. Discussed case with [facility] ICU physician, accepting attending Dr. Ekta Gayle. Pt will be transferred to [facility] ICU for further care with Dr. Jeremy Ricardo of urology following.      Pt will require STAT CT cystogram on arrival     Diagnosis     Clinical Impression: 1. Sepsis, due to unspecified organism ([facility] [address]    2. Acute cystitis with hematuria    3. Free fluid in pelvis concerning for possible intra and extraperitoneal bladder rupture     Disposition: Transfer    Follow-up Information     None           Patient's Medications   Start Taking    No medications on file   Continue Taking    ACETAMINOPHEN (TYLENOL EXTRA STRENGTH) 500 MG TABLET    Take 2 Tabs by mouth every six (6) hours as needed for Pain. ASPIRIN DELAYED-RELEASE 81 MG TABLET    Take  by mouth daily. CALCIUM CARBONATE-VITAMIN D3 600 MG (1,500 MG)-800 UNIT CHEW    Take 1 Tab by Mouth Once a Day. CHOLECALCIFEROL (VITAMIN D3) 1,000 UNIT TABLET    1,000 Units. EZETIMIBE (ZETIA) 10 MG TABLET    Take  by mouth. MULTIVITAMINS-MINERALS-LUTEIN (MEN'S CENTRUM SILVER WITH LUTEIN) TAB TABLET    Take 1 Tab by Mouth Once a Day. SILODOSIN (RAPAFLO) 8 MG CAPSULE    Take 1 Cap by mouth daily (with dinner). Indications: benign prostatic hyperplasia with lower urinary tract sx    ZINC MTH/COPPER/SAW PALM/GNSG (PROSTATE HEALTH FORMULA PO)    Take  by mouth. These Medications have changed    No medications on file   Stop Taking    No medications on file     _______________________________    [address] [facility] & [facility] acting as a scribe for and in the presence of Digna Haro DO      June 17, 2018 at 1:22 PM       Provider Attestation:      I personally performed the services described in the documentation, reviewed the documentation, as recorded by the scribe in my presence, and it accurately and completely records my words and actions.  June 17, 2018 at 1:22 PM - Digna Haro DO

## 2018-06-17 NOTE — ED NOTES
Pt notified of NPO status to possible surgical intervention of ruptured bladder  Verbalizes understanding

## 2018-06-17 NOTE — ED TRIAGE NOTES
Pt arrives via ems from the [facility]  No output from borges since this morning but urine coming around borges into diaper  +n/v/d X 2 days per daughter and weakness  Ems reports fever of 101.2

## 2018-06-18 LAB
ATRIAL RATE: 111 BPM
CALCULATED P AXIS, ECG09: 85 DEGREES
CALCULATED R AXIS, ECG10: 79 DEGREES
CALCULATED T AXIS, ECG11: 59 DEGREES
DIAGNOSIS, 93000: NORMAL
P-R INTERVAL, ECG05: 154 MS
Q-T INTERVAL, ECG07: 354 MS
QRS DURATION, ECG06: 90 MS
QTC CALCULATION (BEZET), ECG08: 481 MS
VENTRICULAR RATE, ECG03: 111 BPM

## 2018-06-18 NOTE — ED NOTES
Pt with +blood cultures x 2, gram - rods and gram + cocci clusters. Pt transferred to [facility] ICU, called and spoke with RN Nataliia Alvarado and gave + culture results.  Lorenzo Chopra PA-C 10:44 AM

## 2018-06-18 NOTE — PROGRESS NOTES
Pt with +blood cultures x 2, gram - rods and gram + cocci clusters. Pt transferred to [facility] ICU, called and spoke with RN Jw Iqbal and gave + culture results.  Blanca Rao PA-C 10:44 AM

## 2018-06-18 NOTE — CALL BACK NOTE
I spoke with Dr. Liza Sadler who's on call for accepting provider group at [facility] to relay positive blood culture results relayed to me; was gram negative rods.  - manuela shah

## 2018-06-20 LAB
BACTERIA SPEC CULT: ABNORMAL
BACTERIA SPEC CULT: ABNORMAL
GRAM STN SPEC: ABNORMAL
SERVICE CMNT-IMP: ABNORMAL

## 2018-06-20 NOTE — PHYSICIAN ADVISORY
Letter of admission status determination     Paul Anaya   Age: 80 y.o. MRN: [id number]  [facility]:  [id number]    Date of admission:  6/17/2018    I have reviewed this case as it involves a Medicare patient admitted as inpatient that has not been hospitalized for at least two midnights. An Inpatient order was placed and Inpatient care was initiated but the patient was subsequently transferred to another hospital for further hospital care. However, based on patient's presenting condition, it was reasonable for the admitting physician at the time the admission order was placed to expect the patient to require medically necessary hospital services for at least two midnights. Therefore, I agree with the attending physician's decision to admit Paul Anaya as INPATIENT. The final decision regarding the patient's hospitalization status depends on the attending physician's judgment.         Vasquez Michael MD, [place]LE[place], [address] [facility]  Physician Keyur Paulino.  [phone]    June 20, 2018   4:34 PM

## 2018-06-21 LAB
BACTERIA SPEC CULT: ABNORMAL
GRAM STN SPEC: ABNORMAL
SERVICE CMNT-IMP: ABNORMAL
SERVICE CMNT-IMP: ABNORMAL

## 2018-07-31 ENCOUNTER — HOSPITAL ENCOUNTER (EMERGENCY)
Age: 83
Discharge: NURSING, REHAB OR LONG-TERM CARE | End: 2018-07-31
Attending: EMERGENCY MEDICINE
Payer: MEDICARE

## 2018-07-31 VITALS
TEMPERATURE: 98 F | BODY MASS INDEX: 23.43 KG/M2 | WEIGHT: 173 LBS | HEART RATE: 93 BPM | RESPIRATION RATE: 18 BRPM | DIASTOLIC BLOOD PRESSURE: 91 MMHG | SYSTOLIC BLOOD PRESSURE: 148 MMHG | OXYGEN SATURATION: 95 % | HEIGHT: 72 IN

## 2018-07-31 DIAGNOSIS — T83.9XXA PROBLEM WITH FOLEY CATHETER, INITIAL ENCOUNTER (HCC): ICD-10-CM

## 2018-07-31 DIAGNOSIS — N39.0 ACUTE UTI (URINARY TRACT INFECTION): Primary | ICD-10-CM

## 2018-07-31 LAB
APPEARANCE UR: ABNORMAL
BACTERIA URNS QL MICRO: ABNORMAL /HPF
BILIRUB UR QL: NEGATIVE
COLOR UR: YELLOW
EPITH CASTS URNS QL MICRO: ABNORMAL /LPF (ref 0–5)
GLUCOSE UR STRIP.AUTO-MCNC: NEGATIVE MG/DL
HGB UR QL STRIP: ABNORMAL
KETONES UR QL STRIP.AUTO: NEGATIVE MG/DL
LEUKOCYTE ESTERASE UR QL STRIP.AUTO: ABNORMAL
NITRITE UR QL STRIP.AUTO: POSITIVE
PH UR STRIP: 5.5 [PH] (ref 5–8)
PROT UR STRIP-MCNC: 100 MG/DL
RBC #/AREA URNS HPF: ABNORMAL /HPF (ref 0–5)
SP GR UR REFRACTOMETRY: 1.02 (ref 1–1.03)
UROBILINOGEN UR QL STRIP.AUTO: 1 EU/DL (ref 0.2–1)
WBC URNS QL MICRO: ABNORMAL /HPF (ref 0–4)

## 2018-07-31 PROCEDURE — 87086 URINE CULTURE/COLONY COUNT: CPT | Performed by: NURSE PRACTITIONER

## 2018-07-31 PROCEDURE — 87077 CULTURE AEROBIC IDENTIFY: CPT | Performed by: NURSE PRACTITIONER

## 2018-07-31 PROCEDURE — 81001 URINALYSIS AUTO W/SCOPE: CPT | Performed by: NURSE PRACTITIONER

## 2018-07-31 PROCEDURE — 87186 SC STD MICRODIL/AGAR DIL: CPT | Performed by: NURSE PRACTITIONER

## 2018-07-31 PROCEDURE — 51702 INSERT TEMP BLADDER CATH: CPT

## 2018-07-31 PROCEDURE — 99283 EMERGENCY DEPT VISIT LOW MDM: CPT

## 2018-07-31 PROCEDURE — 74011250637 HC RX REV CODE- 250/637: Performed by: NURSE PRACTITIONER

## 2018-07-31 RX ORDER — CEPHALEXIN 500 MG/1
500 CAPSULE ORAL 3 TIMES DAILY
Qty: 30 CAP | Refills: 0 | Status: SHIPPED | OUTPATIENT
Start: 2018-07-31 | End: 2018-08-03

## 2018-07-31 RX ORDER — CEPHALEXIN 250 MG/1
500 CAPSULE ORAL
Status: COMPLETED | OUTPATIENT
Start: 2018-07-31 | End: 2018-07-31

## 2018-07-31 RX ADMIN — CEPHALEXIN 500 MG: 250 CAPSULE ORAL at 18:08

## 2018-07-31 NOTE — ED PROVIDER NOTES
HPI Comments: 5:12 PM Adriana Zendejas is a 80 y.o. male with h/o prostate CA who presents to ED complaining of possible infection around his borges catheter. States he has been filling up his bag but he has been having the sensation that he still has to urinate. He has only had the borges in for 2 days. Otherwise fine. Denies CP, SOB, fever, chills, nausea, hematuria, flank pain, penile pain, or any other associated sx. No other complaint or concerns. PCP: Gurwinder Mariscal NP The history is provided by the patient. History limited by: No communication barrier\ No  was used. Past Medical History:  
Diagnosis Date  Benign prostatic hyperplasia without lower urinary tract symptoms  BPH without obstruction/lower urinary tract symptoms  Hormone refractory prostate cancer ([address]  Hypercholesterolemia  Hypertrophy of prostate without urinary obstruction  Lower urinary tract symptoms (LUTS)  Malignant neoplasm of prostate [facility])   
 [id number] [facility] Adenocarcinoma of the Prostate, dx 4/2012 pa[place] 4+3, presenting PSA 8.9ng/mL.  Nocturia  Osseous metastasis ([address]  Pelvic lymphadenopathy  Personal history of colonic polyps  Urinary retention  Vitamin D deficiency Past Surgical History:  
Procedure Laterality Date  ENDOSCOPY, COLON, DIAGNOSTIC  2006/2011  HX APPENDECTOMY  HX CATARACT REMOVAL  03/03/2015  HX TONSILLECTOMY Family History:  
Problem Relation Age of Onset  Hypertension Mother Social History Social History  Marital status:  Spouse name: N/A  
 Number of children: N/A  
 Years of education: N/A Occupational History  Not on file. Social History Main Topics  Smoking status: Never Smoker  Smokeless tobacco: Never Used  Alcohol use No  
 Drug use: No  
 Sexual activity: Not on file Other Topics Concern  Not on file Social History Narrative ALLERGIES: Alendronate Review of Systems Constitutional: Negative. Negative for chills, fatigue and fever. HENT: Negative. Negative for sore throat. Eyes: Negative. Respiratory: Negative. Negative for cough and shortness of breath. Cardiovascular: Negative. Negative for chest pain and palpitations. Gastrointestinal: Negative. Negative for abdominal pain, nausea and vomiting. Genitourinary: Positive for urgency. Negative for dysuria, hematuria and penile pain. Musculoskeletal: Negative. Skin: Negative. Allergic/Immunologic: Negative. Neurological: Negative. Negative for dizziness, weakness, light-headedness and headaches. Psychiatric/Behavioral: Negative. All other systems reviewed and are negative. Vitals:  
 07/31/18 1708 BP: (!) 148/91 Pulse: 93 Resp: 18 Temp: 98 °F (36.7 °C) SpO2: 95% Weight: 78.5 kg (173 lb) Height: 6' (1.829 m) Physical Exam  
Constitutional: He is oriented to person, place, and time. He appears well-developed and well-nourished. No distress. HENT:  
Head: Normocephalic and atraumatic. Eyes: Pupils are equal, round, and reactive to light. Neck: Normal range of motion. Neck supple. Cardiovascular: Normal rate and regular rhythm. Pulmonary/Chest: Effort normal and breath sounds normal.  
Abdominal: Soft. Bowel sounds are normal.  
Genitourinary:  
Genitourinary Comments: Borges in place Musculoskeletal: Normal range of motion. Neurological: He is alert and oriented to person, place, and time. Skin: Skin is warm and dry. Psychiatric: He has a normal mood and affect. Nursing note and vitals reviewed. MDM Number of Diagnoses or Management Options Acute UTI (urinary tract infection):  
Problem with Borges catheter, initial encounter [facility]):  
Diagnosis management comments: MDM:  Will change out borges and eval urine for pathology.   Isma Albrecht NP  6:03 PM 
 
 
 
  
Amount and/or Complexity of Data Reviewed Clinical lab tests: ordered and reviewed Risk of Complications, Morbidity, and/or Mortality Presenting problems: moderate Diagnostic procedures: moderate Management options: moderate ED Course Procedures Scribe Attestation Myrna Ramon acting as a scribe for and in the presence of Shimon Croft NP     
July 31, 2018 at 5:24 PM 
    
Provider Attestation:     
I personally performed the services described in the documentation, reviewed the documentation, as recorded by the scribe in my presence, and it accurately and completely records my words and actions. July 31, 2018 at 5:24 PM - Shimon Croft NP Diagnosis: 1. Acute UTI (urinary tract infection) 2. Problem with Garcia catheter, [facility] Disposition:   discharged to home. Follow-up Information Follow up With Details Comments Contact Info Milo Liang NP Call for follow up Patient's Medications Start Taking CEPHALEXIN (KEFLEX) 500 MG CAPSULE    Take 1 Cap by mouth three (3) times daily for 10 days. Continue Taking ACETAMINOPHEN (TYLENOL EXTRA STRENGTH) 500 MG TABLET    Take 2 Tabs by mouth every six (6) hours as needed for Pain. ASPIRIN DELAYED-RELEASE 81 MG TABLET    Take  by mouth daily. CALCIUM CARBONATE-VITAMIN D3 600 MG (1,500 MG)-800 UNIT CHEW    Take 1 Tab by Mouth Once a Day. CHOLECALCIFEROL (VITAMIN D3) 1,000 UNIT TABLET    1,000 Units. EZETIMIBE (ZETIA) 10 MG TABLET    Take  by mouth. MULTIVITAMINS-MINERALS-LUTEIN (MEN'S CENTRUM SILVER WITH LUTEIN) TAB TABLET    Take 1 Tab by Mouth Once a Day. SILODOSIN (RAPAFLO) 8 MG CAPSULE    Take 1 Cap by mouth daily (with dinner). Indications: benign prostatic hyperplasia with lower urinary tract sx These Medications have changed No medications on file Stop Taking No medications on file

## 2018-07-31 NOTE — DISCHARGE INSTRUCTIONS
Cortilia Activation    Thank you for requesting access to Cortilia. Please follow the instructions below to securely access and download your online medical record. Cortilia allows you to send messages to your doctor, view your test results, renew your prescriptions, schedule appointments, and more. How Do I Sign Up? 1. In your internet browser, go to [email or web address].[employer]  2. Click on the First Time User? Click Here link in the Sign In box. You will be redirect to the New Member Sign Up page. 3. Enter your Cortilia Access Code exactly as it appears below. You will not need to use this code after youve completed the sign-up process. If you do not sign up before the expiration date, you must request a new code. Cortilia Access Code: [email or web address]  Expires: 2018  2:11 PM (This is the date your Cortilia access code will )    4. Enter the last four digits of your Social Security Number (xxxx) and Date of Birth (mm/dd/yyyy) as indicated and click Submit. You will be taken to the next sign-up page. 5. Create a Cortilia ID. This will be your Cortilia login ID and cannot be changed, so think of one that is secure and easy to remember. 6. Create a Cortilia password. You can change your password at any time. 7. Enter your Password Reset Question and Answer. This can be used at a later time if you forget your password. 8. Enter your e-mail address. You will receive e-mail notification when new information is available in [address] 9. Click Sign Up. You can now view and download portions of your medical record. 10. Click the Download Summary menu link to download a portable copy of your medical information. Additional Information    If you have questions, please visit the Frequently Asked Questions section of the Cortilia website at [email or web address]/. Remember, Cortilia is NOT to be used for urgent needs. For medical emergencies, dial 911.

## 2018-08-03 ENCOUNTER — HOSPITAL ENCOUNTER (EMERGENCY)
Age: 83
Discharge: HOME | End: 2018-08-03
Attending: EMERGENCY MEDICINE
Payer: MEDICARE

## 2018-08-03 VITALS
DIASTOLIC BLOOD PRESSURE: 96 MMHG | OXYGEN SATURATION: 97 % | SYSTOLIC BLOOD PRESSURE: 149 MMHG | BODY MASS INDEX: 23.43 KG/M2 | RESPIRATION RATE: 16 BRPM | WEIGHT: 173 LBS | HEART RATE: 82 BPM | HEIGHT: 72 IN | TEMPERATURE: 98.1 F

## 2018-08-03 DIAGNOSIS — T83.511D URINARY TRACT INFECTION ASSOCIATED WITH INDWELLING URETHRAL CATHETER, SUBSEQUENT ENCOUNTER: Primary | ICD-10-CM

## 2018-08-03 DIAGNOSIS — N39.0 URINARY TRACT INFECTION ASSOCIATED WITH INDWELLING URETHRAL CATHETER, SUBSEQUENT ENCOUNTER: Primary | ICD-10-CM

## 2018-08-03 LAB
BACTERIA SPEC CULT: ABNORMAL
SERVICE CMNT-IMP: ABNORMAL

## 2018-08-03 PROCEDURE — 99283 EMERGENCY DEPT VISIT LOW MDM: CPT

## 2018-08-03 PROCEDURE — 74011250637 HC RX REV CODE- 250/637: Performed by: EMERGENCY MEDICINE

## 2018-08-03 RX ORDER — SULFAMETHOXAZOLE AND TRIMETHOPRIM 800; 160 MG/1; MG/1
1 TABLET ORAL 2 TIMES DAILY
Qty: 14 TAB | Refills: 0 | Status: SHIPPED | OUTPATIENT
Start: 2018-08-03 | End: 2018-08-10

## 2018-08-03 RX ORDER — SULFAMETHOXAZOLE AND TRIMETHOPRIM 800; 160 MG/1; MG/1
1 TABLET ORAL
Status: COMPLETED | OUTPATIENT
Start: 2018-08-03 | End: 2018-08-03

## 2018-08-03 RX ADMIN — SULFAMETHOXAZOLE AND TRIMETHOPRIM 1 TABLET: 800; 160 TABLET ORAL at 20:25

## 2018-08-03 NOTE — ED NOTES
I performed a brief evaluation, including history and physical, of the patient here in triage and I have determined that pt will need further treatment and evaluation from the fast track provider. I have placed initial orders to help in expediting patients care. August 03, 2018 at 5:40 PM - Nahun Tanner PA-C Visit Vitals  BP (!) 149/96 (BP 1 Location: Right arm, BP Patient Position: At rest)  Pulse 82  Temp 98.1 °F (36.7 °C)  Resp 16  
 Ht 6' (1.829 m)  Wt 78.5 kg (173 lb)  SpO2 97%  BMI 23.46 kg/m2

## 2018-08-04 NOTE — DISCHARGE INSTRUCTIONS
Learning About Urinary Catheter Care to Prevent Infection  What is a urinary catheter? A urinary catheter is a flexible plastic tube used to drain urine from your bladder when you can't urinate on your own. The catheter allows urine to drain from the bladder into a bag. Two types of drainage bags may be used with a urinary catheter. · A bedside bag is a large bag that you can hang on the side of your bed or on a chair. You can use it overnight or anytime you will be sitting or lying down for a long time. · A leg bag is a small bag that you can use during the day. It is usually attached to your thigh or calf and hidden under your clothes. Having a urinary catheter increases your risk of getting a urinary tract infection. Germs may get on the catheter and cause an infection in your bladder or kidneys. The longer you have a catheter, the more likely it is that you will get an infection. You can help prevent this problem with good hygiene and careful handling of your catheter and drainage bags. How can you help prevent infection? Take care to be clean  · Always wash your hands well before and after you handle your catheter. · Clean the skin around the catheter twice a day using soap and water. Dry with a clean towel afterward. You can shower with your catheter and drainage bag in place unless your doctor told you not to. · When you clean around the catheter, check the surrounding skin for signs of infection. Look for things like pus or irritated, swollen, red, or tender skin around the catheter. Be careful with your drainage bag  · Always keep the drainage bag below the level of your bladder. This will help keep urine from flowing back into your bladder. · Check often to see that urine is flowing through the catheter into the drainage bag. · Empty the drainage bag when it is half full. This will keep it from overflowing or backing up.   · When you empty the drainage bag, do not let the tubing or drain spout touch anything. Be careful with your catheter  · Do not unhook the catheter from the drain tube. That could let germs get into the tube. · Make sure that the catheter tubing does not get twisted or kinked. · Do not tug or pull on the catheter. And make sure that the drainage bag does not drag or pull on the catheter. · Do not put powder or lotion on the skin around the catheter. · Talk with your doctor about your options for sexual intercourse while wearing a catheter. How do you empty a urine drainage bag? If your doctor has asked you to keep a record, write down the amount of urine in the bag before you empty it. Wash your hands before and after you touch the bag. 1. Remove the drain spout from its sleeve at the bottom of the drainage bag.  2. Open the valve on the drain spout. Let the urine flow out into the toilet or a container. Be careful not to let the tubing or drain spout touch anything. 3. After you empty the bag, wipe off any liquid on the end of the drain spout. Close the valve. Then put the drain spout back into its sleeve at the bottom of the collection bag. How do you add a bedside bag to a leg bag? Wash your hands before and after you handle the bags. 1. Empty the leg bag attached to the catheter. 2. Put a clean towel under the leg bag.  3. Use an alcohol wipe to clean the tip of the bedside bag. Then connect the bedside bag to the leg bag. How can you clean a bedside drainage bag? Many people clean their bedside bag in the morning if they switch to a leg bag. To clean a bedside drainage ba. Remove the bedside bag from the leg bag.  2. Fill the bag with 2 parts vinegar and 3 parts water. Let it stand for 20 minutes. 3. Empty the bag, and let it air dry. When should you call for help? Call your doctor now or seek immediate medical care if:  · You have symptoms of a urinary infection. These may include:  ¨ Pain or burning when you urinate.   ¨ A frequent need to urinate without being able to pass much urine. ¨ Pain in the flank, which is just below the rib cage and above the waist on either side of the back. ¨ Blood in your urine. ¨ A fever. · Your urine smells bad. · You see large blood clots in your urine. · No urine or very little urine is flowing into the bag for 4 or more hours. Watch closely for changes in your health, and be sure to contact your doctor if:  · The area around the catheter becomes irritated, swollen, red, or tender, or there is pus draining from it. · Urine is leaking from the place where the catheter enters your body. Follow-up care is a key part of your treatment and safety. Be sure to make and go to all appointments, and call your doctor if you are having problems. It's also a good idea to know your test results and keep a list of the medicines you take. Where can you learn more? Go to http://alecia-george.info/. Enter U010 in the search box to learn more about \"Learning About Urinary Catheter Care to Prevent Infection. \"  Current as of: May 12, 2017  Content Version: 11.7  © 3906-5157 [employer]. Care instructions adapted under license by [employer] (which disclaims liability or warranty for this information). If you have questions about a medical condition or this instruction, always ask your healthcare professional. [address] any warranty or liability for your use of this information. Urinary Tract Infections in Men: Care Instructions  Your Care Instructions    A urinary tract infection, or UTI, is a general term for an infection anywhere between the kidneys and the tip of the penis. UTIs can also be a result of a prostate problem. Most cause pain or burning when you urinate. Most UTIs are caused by bacteria and can be cured with antibiotics. It is important to complete your treatment so that the infection does not get worse.   Follow-up care is a key part of your treatment and safety. Be sure to make and go to all appointments, and call your doctor if you are having problems. It's also a good idea to know your test results and keep a list of the medicines you take. How can you care for yourself at home? · Take your antibiotics as prescribed. Do not stop taking them just because you feel better. You need to take the full course of antibiotics. · Take your medicines exactly as prescribed. Your doctor may have prescribed a medicine, such as phenazopyridine (Pyridium), to help relieve pain when you urinate. This turns your urine orange. You may stop taking it when your symptoms get better. But be sure to take all of your antibiotics, which treat the infection. · Drink extra water for the next day or two. This will help make the urine less concentrated and help wash out the bacteria causing the infection. (If you have kidney, heart, or liver disease and have to limit your fluids, talk with your doctor before you increase your fluid intake.)  · Avoid drinks that are carbonated or have caffeine. They can irritate the bladder. · Urinate often. Try to empty your bladder each time. · To relieve pain, take a hot bath or lay a heating pad (set on low) over your lower belly or genital area. Never go to sleep with a heating pad in place. To help prevent UTIs  · Drink plenty of fluids, enough so that your urine is light yellow or clear like water. If you have kidney, heart, or liver disease and have to limit fluids, talk with your doctor before you increase the amount of fluids you drink. · Urinate when you have the urge. Do not hold your urine for a long time. Urinate before you go to sleep. · Keep your penis clean. Catheter care  If you have a drainage tube (catheter) in place, the following steps will help you care for it. · Always wash your hands before and after touching your catheter. · Check the area around the urethra for inflammation or signs of infection.  Signs of infection include irritated, swollen, red, or tender skin, or pus around the catheter. · Clean the area around the catheter with soap and water two times a day. Dry with a clean towel afterward. · Do not apply powder or lotion to the skin around the catheter. To empty the urine collection bag  · Wash your hands with soap and water. · Without touching the drain spout, remove the spout from its sleeve at the bottom of the collection bag. Open the valve on the spout. · Let the urine flow out of the bag and into the toilet or a container. Do not let the tubing or drain spout touch anything. · After you empty the bag, clean the end of the drain spout with tissue and water. Close the valve and put the drain spout back into its sleeve at the bottom of the collection bag. · Wash your hands with soap and water. When should you call for help? Call your doctor now or seek immediate medical care if:    · Symptoms such as a fever, chills, nausea, or vomiting get worse or happen for the first time.     · You have new pain in your back just below your rib cage. This is called flank pain.     · There is new blood or pus in your urine.     · You are not able to take or keep down your antibiotics.    Watch closely for changes in your health, and be sure to contact your doctor if:    · You are not getting better after taking an antibiotic for 2 days.     · Your symptoms go away but then come back. Where can you learn more? Go to http://alecia-george.info/. Enter B412 in the search box to learn more about \"Urinary Tract Infections in Men: Care Instructions. \"  Current as of: May 12, 2017  Content Version: 11.7  © 8715-1159 [employer]. Care instructions adapted under license by [employer] (which disclaims liability or warranty for this information).  If you have questions about a medical condition or this instruction, always ask your healthcare professional. [employer] disclaims any warranty or liability for your use of this information.

## 2018-08-04 NOTE — ED NOTES
Assessment of borges drainage bag reveals dark orange putrid smelling urine. About 600mL. Per family member in room, pt had about 200mL of dark orange urine at 1500 today. Borges catheter does not appear obstructed. No clots or blood noted. Per Dr. Keith Area may hold off on borges irrigation and leg bad change for now.

## 2018-08-04 NOTE — ED NOTES
I have reviewed discharge instructions with the patient. The patient verbalized understanding. Patient taken out of ED in wheelchair by daughter.

## 2018-08-04 NOTE — ED PROVIDER NOTES
EMERGENCY DEPARTMENT HISTORY AND PHYSICAL EXAM 
 
8:15 PM 
 
 
Date: 8/3/2018 Patient Name: Kyung Morejon History of Presenting Illness Chief Complaint Patient presents with  Blood in Urine History Provided By: Patient and Patient's Daughter Chief Complaint: Blood in Urine Duration:  1 Day Timing:  Acute Location: N/A Quality: Aching Severity: 1 out of 10 Modifying Factors: Nothing makes it better or worse Associated Symptoms: pain on the tip of penis Additional History (Context): Kyung Morejon is a 80 y.o. male with urinary retention, pelvic lymphadenopathy, LUTS, and malignate meoplasm of prostate who presents with acute blood in his urine. Patient also stated that he has some discomfort on the tip of his penis where the catheter is in place. The quality is aching and the severity is 1 out of 10. Nothing makes it better or worse. Patient's daughter stated that he was in the ER on Tuesday for an infection and also said that there was a clot in the tubing of the catheter. He has an indwelling catheter put in place and it was changed on Tuesday (07/31/2018). He denies any fevers, vomiting, blood thinning medications. No other concerns or symptoms at this time. PCP: Margo Nation NP Current Outpatient Prescriptions Medication Sig Dispense Refill  trimethoprim-sulfamethoxazole (BACTRIM DS, SEPTRA DS) 160-800 mg per tablet Take 1 Tab by mouth two (2) times a day for 7 days. 14 Tab 0  
 silodosin (RAPAFLO) 8 mg capsule Take 1 Cap by mouth daily (with dinner). Indications: benign prostatic hyperplasia with lower urinary tract sx 90 Cap 3  
 acetaminophen (TYLENOL EXTRA STRENGTH) 500 mg tablet Take 2 Tabs by mouth every six (6) hours as needed for Pain. 50 Tab 0  
 calcium carbonate-vitamin D3 600 mg (1,500 mg)-800 unit chew Take 1 Tab by Mouth Once a Day.  cholecalciferol (VITAMIN D3) 1,000 unit tablet 1,000 Units.     
 multivitamins-minerals-lutein (MEN'S CENTRUM SILVER WITH LUTEIN) tab tablet Take 1 Tab by Mouth Once a Day.  aspirin delayed-release 81 mg tablet Take  by mouth daily.  ezetimibe (ZETIA) 10 mg tablet Take  by mouth. Past History Past Medical History: 
Past Medical History:  
Diagnosis Date  Benign prostatic hyperplasia without lower urinary tract symptoms  BPH without obstruction/lower urinary tract symptoms  Hormone refractory prostate cancer ([facility] [address]  Hypercholesterolemia  Hypertrophy of prostate without urinary obstruction  Lower urinary tract symptoms (LUTS)  Malignant neoplasm of prostate [facility])   
 [id number] [facility] Adenocarcinoma of the Prostate, dx 4/2012 paola 4+3, presenting PSA 8.9ng/mL.  Nocturia  Osseous metastasis ([facility] [address]  Pelvic lymphadenopathy  Personal history of colonic polyps  Urinary retention  Vitamin D deficiency Past Surgical History: 
Past Surgical History:  
Procedure Laterality Date  ENDOSCOPY, COLON, DIAGNOSTIC  2006/2011  HX APPENDECTOMY  HX CATARACT REMOVAL  03/03/2015  HX TONSILLECTOMY Family History: 
Family History Problem Relation Age of Onset  Hypertension Mother Social History: 
Social History Substance Use Topics  Smoking status: Never Smoker  Smokeless tobacco: Never Used  Alcohol use No  
 
 
Allergies: Allergies Allergen Reactions  Alendronate Other (comments) Review of Systems Review of Systems Constitutional: Negative for chills and fever. Respiratory: Negative for cough. Gastrointestinal: Negative for diarrhea, nausea and vomiting. Genitourinary: Positive for penile pain (on the tip). Neurological: Negative for light-headedness and headaches. Physical Exam  
 
Visit Vitals  BP (!) 149/96 (BP 1 Location: Right arm, BP Patient Position: At rest)  Pulse 82  Temp 98.1 °F (36.7 °C)  Resp 16  
 Ht 6' (1.829 m)  Wt 78.5 kg (173 lb)  SpO2 97%  BMI 23.46 kg/m2 Physical Exam  
Constitutional: He is oriented to person, place, and time. He appears well-developed and well-nourished. HENT:  
Head: Normocephalic and atraumatic. Neck: Neck supple. No JVD present. Cardiovascular: Normal rate and regular rhythm. Pulmonary/Chest: Effort normal and breath sounds normal. No respiratory distress. Abdominal: Soft. He exhibits no distension. There is no tenderness. There is no rebound and no guarding. Genitourinary:  
Genitourinary Comments: Indwelling catheter in place with clear yellow urine No blood at meatus Musculoskeletal: He exhibits no edema. Neurological: He is alert and oriented to person, place, and time. Skin: Skin is warm and dry. No erythema. Psychiatric: Judgment normal.  
 
 
 
 
Medical Decision Making I am the first provider for this patient. I reviewed the vital signs, available nursing notes, past medical history, past surgical history, family history and social history. Vital Signs-Reviewed the patient's vital signs. Pulse Oximetry Analysis -  97% on room air wnl Cardiac Monitor: 
Rate: 82 bpm 
 
 
Records Reviewed: Nursing Notes (Time of Review: 8:15 PM) ED Course: Progress Notes, Reevaluation, and Consults: 
 
Provider Notes (Medical Decision Making): 81 y/o male presents for eval of borges. Had small clot in tubing, now has yellow urine. Pt well appearing, not on anticaogulation, borges draining, no need for irrigation. Reviewed urine culture result and resistant to keflex so placed on bactrim. Has appt with urology in 3 days. Discussed return precautions. Stable for RI home. Diagnosis Clinical Impression: 1. Urinary tract infection associated with indwelling urethral catheter, subsequent encounter Disposition: discharged Follow-up Information Follow up With Details Comments Contact Info Alice Almaraz MD In 3 days  [id number] [facility] [id number] [phone] [facility] EMERGENCY DEPT  As needed, If symptoms worsen [address] [facility] [address]. 
[phone] Discharge Medication List as of 8/3/2018  8:20 PM  
  
START taking these medications Details  
trimethoprim-sulfamethoxazole (BACTRIM DS, SEPTRA DS) 160-800 mg per tablet Take 1 Tab by mouth two (2) times a day for 7 days. , Normal, Disp-14 Tab, R-0  
  
  
CONTINUE these medications which have NOT CHANGED Details  
silodosin (RAPAFLO) 8 mg capsule Take 1 Cap by mouth daily (with dinner). Indications: benign prostatic hyperplasia with lower urinary tract sx, Normal, Disp-90 Cap, R-3, HÉCTOR  
  
acetaminophen (TYLENOL EXTRA STRENGTH) 500 mg tablet Take 2 Tabs by mouth every six (6) hours as needed for Pain., Print, Disp-50 Tab, R-0  
  
calcium carbonate-vitamin D3 600 mg (1,500 mg)-800 unit chew Take 1 Tab by Mouth Once a Day., Historical Med  
  
cholecalciferol (VITAMIN D3) 1,000 unit tablet 1,000 Units. , Historical Med  
  
multivitamins-minerals-lutein (MEN'S CENTRUM SILVER WITH LUTEIN) tab tablet Take 1 Tab by Mouth Once a Day., Historical Med  
  
aspirin delayed-release 81 mg tablet Take  by mouth daily. , Historical Med  
  
ezetimibe (ZETIA) 10 mg tablet Take  by mouth. Historical Med  
  
  
STOP taking these medications  
  
 cephALEXin (KEFLEX) 500 mg capsule Comments:  
Reason for Stopping:   
   
  
 
_______________________________ Attestations: 
Scribe Attestation Mony Avelar acting as a scribe for and in the presence of Sanjay Mccormack,  August 03, 2018 at 8:15 PM 
    
Provider Attestation:     
I personally performed the services described in the documentation, reviewed the documentation, as recorded by the scribe in my presence, and it accurately and completely records my words and actions. August 03, 2018 at 8:15 PM - Sanjay Mccormack DO   
_______________________________

## 2018-10-18 ENCOUNTER — HOSPITAL ENCOUNTER (EMERGENCY)
Age: 83
Discharge: HOME | End: 2018-10-19
Attending: EMERGENCY MEDICINE
Payer: MEDICARE

## 2018-10-18 DIAGNOSIS — T83.9XXA FOLEY CATHETER PROBLEM, INITIAL ENCOUNTER (HCC): ICD-10-CM

## 2018-10-18 DIAGNOSIS — N39.0 URINARY TRACT INFECTION WITH HEMATURIA, SITE UNSPECIFIED: Primary | ICD-10-CM

## 2018-10-18 DIAGNOSIS — R31.9 URINARY TRACT INFECTION WITH HEMATURIA, SITE UNSPECIFIED: Primary | ICD-10-CM

## 2018-10-18 PROCEDURE — 87077 CULTURE AEROBIC IDENTIFY: CPT | Performed by: PHYSICIAN ASSISTANT

## 2018-10-18 PROCEDURE — 81001 URINALYSIS AUTO W/SCOPE: CPT | Performed by: PHYSICIAN ASSISTANT

## 2018-10-18 PROCEDURE — 80053 COMPREHEN METABOLIC PANEL: CPT | Performed by: PHYSICIAN ASSISTANT

## 2018-10-18 PROCEDURE — 85025 COMPLETE CBC W/AUTO DIFF WBC: CPT | Performed by: PHYSICIAN ASSISTANT

## 2018-10-18 PROCEDURE — 87086 URINE CULTURE/COLONY COUNT: CPT | Performed by: PHYSICIAN ASSISTANT

## 2018-10-18 PROCEDURE — 87186 SC STD MICRODIL/AGAR DIL: CPT | Performed by: PHYSICIAN ASSISTANT

## 2018-10-18 PROCEDURE — 99285 EMERGENCY DEPT VISIT HI MDM: CPT

## 2018-10-19 VITALS
HEART RATE: 88 BPM | OXYGEN SATURATION: 98 % | DIASTOLIC BLOOD PRESSURE: 100 MMHG | RESPIRATION RATE: 14 BRPM | SYSTOLIC BLOOD PRESSURE: 151 MMHG | TEMPERATURE: 98.1 F | BODY MASS INDEX: 23.57 KG/M2 | HEIGHT: 72 IN | WEIGHT: 174 LBS

## 2018-10-19 LAB
ALBUMIN SERPL-MCNC: 3.6 G/DL (ref 3.4–5)
ALBUMIN/GLOB SERPL: 1.1 {RATIO} (ref 0.8–1.7)
ALP SERPL-CCNC: 86 U/L (ref 45–117)
ALT SERPL-CCNC: 60 U/L (ref 16–61)
ANION GAP SERPL CALC-SCNC: 5 MMOL/L (ref 3–18)
APPEARANCE UR: ABNORMAL
AST SERPL-CCNC: 47 U/L (ref 15–37)
BACTERIA URNS QL MICRO: ABNORMAL /HPF
BASOPHILS # BLD: 0 K/UL (ref 0–0.1)
BASOPHILS NFR BLD: 1 % (ref 0–2)
BILIRUB SERPL-MCNC: 0.4 MG/DL (ref 0.2–1)
BILIRUB UR QL: NEGATIVE
BUN SERPL-MCNC: 16 MG/DL (ref 7–18)
BUN/CREAT SERPL: 22 (ref 12–20)
CALCIUM SERPL-MCNC: 9.3 MG/DL (ref 8.5–10.1)
CHLORIDE SERPL-SCNC: 99 MMOL/L (ref 100–108)
CO2 SERPL-SCNC: 29 MMOL/L (ref 21–32)
COLOR UR: YELLOW
CREAT SERPL-MCNC: 0.74 MG/DL (ref 0.6–1.3)
DIFFERENTIAL METHOD BLD: NORMAL
EOSINOPHIL # BLD: 0.1 K/UL (ref 0–0.4)
EOSINOPHIL NFR BLD: 2 % (ref 0–5)
EPITH CASTS URNS QL MICRO: ABNORMAL /LPF (ref 0–5)
ERYTHROCYTE [DISTWIDTH] IN BLOOD BY AUTOMATED COUNT: 13.4 % (ref 11.6–14.5)
GLOBULIN SER CALC-MCNC: 3.3 G/DL (ref 2–4)
GLUCOSE SERPL-MCNC: 110 MG/DL (ref 74–99)
GLUCOSE UR STRIP.AUTO-MCNC: NEGATIVE MG/DL
HCT VFR BLD AUTO: 44.5 % (ref 36–48)
HCT VFR BLD AUTO: 46.5 % (ref 36–48)
HGB BLD-MCNC: 14.9 G/DL (ref 13–16)
HGB BLD-MCNC: 15.7 G/DL (ref 13–16)
HGB UR QL STRIP: NEGATIVE
KETONES UR QL STRIP.AUTO: NEGATIVE MG/DL
LEUKOCYTE ESTERASE UR QL STRIP.AUTO: ABNORMAL
LYMPHOCYTES # BLD: 1.3 K/UL (ref 0.9–3.6)
LYMPHOCYTES NFR BLD: 22 % (ref 21–52)
MCH RBC QN AUTO: 31.3 PG (ref 24–34)
MCHC RBC AUTO-ENTMCNC: 33.5 G/DL (ref 31–37)
MCV RBC AUTO: 93.5 FL (ref 74–97)
MONOCYTES # BLD: 0.5 K/UL (ref 0.05–1.2)
MONOCYTES NFR BLD: 9 % (ref 3–10)
NEUTS SEG # BLD: 4 K/UL (ref 1.8–8)
NEUTS SEG NFR BLD: 66 % (ref 40–73)
NITRITE UR QL STRIP.AUTO: POSITIVE
PH UR STRIP: 6 [PH] (ref 5–8)
PLATELET # BLD AUTO: 257 K/UL (ref 135–420)
PMV BLD AUTO: 9.3 FL (ref 9.2–11.8)
POTASSIUM SERPL-SCNC: 4.8 MMOL/L (ref 3.5–5.5)
PROT SERPL-MCNC: 6.9 G/DL (ref 6.4–8.2)
PROT UR STRIP-MCNC: NEGATIVE MG/DL
RBC # BLD AUTO: 4.76 M/UL (ref 4.7–5.5)
RBC #/AREA URNS HPF: ABNORMAL /HPF (ref 0–5)
SODIUM SERPL-SCNC: 133 MMOL/L (ref 136–145)
SP GR UR REFRACTOMETRY: 1.02 (ref 1–1.03)
UROBILINOGEN UR QL STRIP.AUTO: 0.2 EU/DL (ref 0.2–1)
WBC # BLD AUTO: 5.9 K/UL (ref 4.6–13.2)
WBC URNS QL MICRO: ABNORMAL /HPF (ref 0–4)

## 2018-10-19 PROCEDURE — 74011250637 HC RX REV CODE- 250/637: Performed by: PHYSICIAN ASSISTANT

## 2018-10-19 PROCEDURE — 85018 HEMOGLOBIN: CPT | Performed by: EMERGENCY MEDICINE

## 2018-10-19 RX ORDER — CIPROFLOXACIN 500 MG/1
500 TABLET ORAL 2 TIMES DAILY
Qty: 28 TAB | Refills: 0 | Status: SHIPPED | OUTPATIENT
Start: 2018-10-19 | End: 2018-10-23

## 2018-10-19 RX ORDER — CIPROFLOXACIN 500 MG/1
500 TABLET ORAL
Status: COMPLETED | OUTPATIENT
Start: 2018-10-19 | End: 2018-10-19

## 2018-10-19 RX ADMIN — CIPROFLOXACIN 500 MG: 500 TABLET, FILM COATED ORAL at 02:52

## 2018-10-19 NOTE — DISCHARGE INSTRUCTIONS

## 2018-10-19 NOTE — ED NOTES
Patient has been on the phone to the family several times, they will be here to pick him up when the nurse arrives to watch the wife, patient dressed and given a lunch

## 2018-10-19 NOTE — ED NOTES
Received bedside report from nurse, patient resting in the bed, states no pain, waiting for Urologist to assess him, given warm blankest, rails up and call bell attached to the bed

## 2018-10-19 NOTE — PROCEDURES
Procedure: Indwelling catheter placement Date of Procedure: 10/19/2018 Patient Name: Debo Jarrett Sex: male MRN: [id number] YOB: 1932 Age: 80 y.o. Wilmer Marin Preoperative diagnosis: hematuria Postoperative diagnosis: same Indications: hematuria This procedure has been fully reviewed with the patient, and 
written informed consent has been obtained. Procedure: After performing the appropriate time-out and prepping and draping the patient in the standard fashion, 2% lidocaine jelly was instilled transurethrally. A 18 [place] Coude Garcia catheter was then placed into the bladder with return of clear yellow urine. There was no resistance with placement of this catheter. The balloon was inflated with 10 ml of sterile water. A urine sample was obtained with sterile technique. The catheter was placed to 
gravity bag drainage and secured to the patient's right thigh. DISPOSITION:   
Ok to [facility]. [facility]

## 2018-10-19 NOTE — ED NOTES
Daughter arrived to take the patient home, reviewed the discharge papers with the daughter, Patient asked this nurse to talk with the daughter. Assisted the patient into his wheel chair and patient taken home with the daughter

## 2018-10-19 NOTE — ED NOTES
Prior to discharge, attempted to clean bleeding on pt borges site. Noted heavier bleeding, soaked on linens. Dr. Vi Maki notified.

## 2018-10-19 NOTE — ED TRIAGE NOTES
Pt arrived by EMS. Pt stated pt fell in the bathroom due to weak legs. Pt denies any pain due to fall. Pt stated noticed blood in the catheter this morning. Pt  has hx of prostate  Cancer and urologist placed long term catheter a couple months ago. Bleeding from penis noted.

## 2018-10-19 NOTE — CONSULTS
Urology Consult Note Patient: Edson Alba               Sex: male          DOA: 10/18/2018 YOB: 1932      Age:  80 y.o.        LOS:  LOS: 0 days Referring physician: Milo Meza Reason for consult: urethral bleeding Assessment This is a 80 y.o. male with - 
Prostate ca sp XRT w bcr on ADT Urinary retention - chronic borges Gross hematuria Plan 1. Suspect urethral bleeding 2/2 possible pulling on catheter. Urine clear yellow. Bleeding at meatus as stopped. Likely from prostatic source. upsized catehter at bedside to 18 fr coude catheter. OK to DC home. Message sent to our office for follow up with Dr. Nedra Zheng. HPI:  
 
Edson Alba is a 80 y.o. male who has been is currently admitted with  Gross hematuria. 12 hour h/o acute gross hematuria. Possible pulling on catheter. Labs stable. On ED urine clear but fresh blood at meatus. No fevers, chills. Has cap sp XRT w recurrence. On ADT Chronic retention. Recently sp cysto, no bladder lesions. No prior episodes of hematuria. Past Medical History:  
Diagnosis Date  BPH (benign prostatic hyperplasia)   
 unspec, whether LUTS present  BPH with urinary obstruction  BPH without obstruction/lower urinary tract symptoms  Hormone refractory prostate cancer ([address]  Hypercholesteremia  Hypertrophy of prostate without urinary obstruction  Indwelling Borges catheter present  Lower urinary tract symptoms (LUTS)  Malignant neoplasm of prostate [facility])   
 [id number] CRPC Adenocarcinoma of the Prostate, dx 4/2012 pa[place] 4+3, presenting PSA 8.9ng/mL.  Nocturia  Osseous metastasis ([address]  Pelvic lymphadenopathy  Personal history of colonic polyps  Urinary retention  Vitamin D deficiency Past Surgical History:  
Procedure Laterality Date  ENDOSCOPY, COLON, DIAGNOSTIC  2006/2011  HX APPENDECTOMY  HX CATARACT REMOVAL  03/03/2015  HX TONSILLECTOMY Family History Problem Relation Age of Onset  Hypertension Mother Social History Socioeconomic History  Marital status:  Spouse name: Not on file  Number of children: Not on file  Years of education: Not on file  Highest education level: Not on file Social Needs  Financial resource strain: Not on file  Food insecurity - worry: Not on file  Food insecurity - inability: Not on file  Transportation needs - medical: Not on file  Transportation needs - non-medical: Not on file Occupational History  Not on file Tobacco Use  Smoking status: Never Smoker  Smokeless tobacco: Never Used Substance and Sexual Activity  Alcohol use: No  
 Drug use: No  
 Sexual activity: Not on file Other Topics Concern  Not on file Social History Narrative  Not on file Prior to Admission medications Medication Sig Start Date End Date Taking? Authorizing Provider  
ciprofloxacin HCl (CIPRO) 500 mg tablet Take 1 Tab by mouth two (2) times a day for 14 days. 10/19/18 11/2/18 Yes Leann Augustin PA  
acetaminophen (TYLENOL EXTRA STRENGTH) 500 mg tablet Take 2 Tabs by mouth every six (6) hours as needed for Pain. 6/13/18   Travis Da Silva MD  
calcium carbonate-vitamin D3 600 mg (1,500 mg)-800 unit chew Take 1 Tab by Mouth Once a Day. Provider, Historical  
cholecalciferol (VITAMIN D3) 1,000 unit tablet 1,000 Units. Provider, Historical  
multivitamins-minerals-lutein (MEN'S CENTRUM SILVER WITH LUTEIN) tab tablet Take 1 Tab by Mouth Once a Day. Provider, Historical  
aspirin delayed-release 81 mg tablet Take  by mouth daily. Provider, Historical  
ezetimibe (ZETIA) 10 mg tablet Take  by mouth. Provider, Historical  
 
 
Allergies Allergen Reactions  Alendronate Other (comments) Review of Systems Constitutional: negative Eyes: negative Ears, Nose, Mouth, Throat, and Face: negative Respiratory: negative Cardiovascular: negative Gastrointestinal: negative Genitourinary:positive for hematuria Integument/Breast: negative Hematologic/Lymphatic: negative Musculoskeletal:negative Physical Exam:  
  
Visit Vitals BP (!) 154/93 Pulse 84 Temp 98.1 °F (36.7 °C) Resp 14 Ht 6' (1.829 m) Wt 174 lb (78.9 kg) SpO2 97% BMI 23.60 kg/m² Physical Exam:  
GENERAL: alert, cooperative, no distress, appears stated age HEENT: NCAT, PERRL 
CV: RRR 
LUNG: unlabored breathing ABDOMEN: soft, non-tender. No masses,  no organomegaly EXTREMITIES:  extremities normal, atraumatic, no cyanosis or edema SKIN: no jaundice : borges w clear yellow urine. Penis normal. Circumcised. Fresh blood clot at meatus. No active bleeding. Testicles normal. 
 
Lab/Data Review: 
BMP:  
Lab Results Component Value Date/Time  (L) 10/18/2018 11:27 PM  
 K 4.8 10/18/2018 11:27 PM  
 CL 99 (L) 10/18/2018 11:27 PM  
 CO2 29 10/18/2018 11:27 PM  
 AGAP 5 10/18/2018 11:27 PM  
  (H) 10/18/2018 11:27 PM  
 BUN 16 10/18/2018 11:27 PM  
 CREA 0.74 10/18/2018 11:27 PM  
 GFRAA >60 10/18/2018 11:27 PM  
 GFRNA >60 10/18/2018 11:27 PM  
 
CBC:  
Lab Results Component Value Date/Time WBC 5.9 10/18/2018 11:27 PM  
 HGB 15.7 10/19/2018 03:09 AM  
 HCT 46.5 10/19/2018 03:09 AM  
  10/18/2018 11:27 PM  
 
COAGS: No results found for: APTT, PTP, INR  
 
 
CT Results: 
Results from Abstract encounter on 18 CT CYSTOGRAM  
 Impression   Auth Prov: Bradley Peterson 
CC Prov:   
  
  
    
[facility] [id number] [facility] [address] [id number] 
[phone]  
  
Imaging Result 
  
Name:   
Kristine Calvert ([id number]) Sex: Male : 
1932 
80year old Patient Class: 
Inpatient Diagnosis: 
  
  
  
      
Procedures Performed: Exam Date/Time: Reason for Exam: 
CT CYSTOGRAM 
 [id number]  06/18/2018 12:16 AM   CT Cystogram Non-Applicable Bladder Rule out bladder rupture [phone] [facility] ICU 
  
  
  
  
EXAMINATION: CT cystogram 
  INDICATION: Abnormal CT, possible bladder perforation 
  
COMPARISON: Outside institution CT 6/17/18 
  
TECHNIQUE: CT of the pelvis performed following 250 cc injection of mixed Omnipaque and saline into bladder via a Garcia catheter, with multiplanar reformations obtained. All CT scans at this facility are performed using dose optimization of technique as appropriate to a performed exam, to include automated exposure control, adjustment of the MA and/or kV according to patient size (including appropriate matching for site-specific examinations), or use of iterative reconstruction technique. 
  
FINDINGS: 
  
Genitourinary: Bladder demonstrates extensive trabecular wall thickening throughout, without active free contrast extravasation. Extensive surrounding fat stranding. Along the ventral right lateral dome there is a slightly oblong shaped structure containing high density possibly layering material, which was also present on standard CT performed one day prior. Garcia catheter in place. Prostate unremarkable, appears somewhat diminutive. 
  
Bowel: No bowel dilatation in the pelvis. Sigmoid diverticulosis. Evidence of distal rectum wall thickening. 
  
Vessels/mesentery/nodes: Atherosclerotic calcifications. Notably enlarged right common iliac and iliac bifurcation nodes. Possibly enlarged left obturator node versus varix. 
  
Miscellaneous: Small fat containing bilateral inguinal hernias. Superficial soft tissues are otherwise unremarkable. 
  
Bones: No acute osseous findings. Large chronic ossific fragment along the anterior right acetabulum. Slightly mottled sclerosis at the femoral heads, possibly nonuniform osteopenia. Lower lumbar degenerative changes. IMPRESSION IMPRESSION: 
  
1. Extensive trabeculated bladder wall thickening.  Surrounding fat stranding. Correlate clinically for cystitis. 
-No active free extravasation identified. 
-Slightly oblong shaped structure along the right ventral bladder dome containing high density possibly layering material was also present on pre-cystogram imaging. Contained perforation with residual contrast or layering calculi possible. 
  
2. Right iliac adenopathy and possible left obturator adenopathy. 
-See additional details above. 
  
Dictated by: Liane Cabrera on Mon Jun 18, 2018 12:56:39 AM EDT  
  
Signed By:Nav Alvarenga MD 
 6/18/2018  1:08 AM 
  
  
  
  
[address] Radiologist [[id number]] ~~This report was copied and pasted from the servicing EHR system. ~~ 
  
 
 
US Results: No results found for this or any previous visit. Renea Ledesma MD 
 
Pager: [phone] Office: [phone]

## 2018-10-19 NOTE — ED NOTES
Bedside and Verbal shift change report given to JOHNATHAN Cano (oncoming nurse) by Chalo Carbajal RN (offgoing nurse). Report included the following information SBAR, ED Summary and Recent Results.

## 2018-10-19 NOTE — ED PROVIDER NOTES
[facility] EMERGENCY DEPT 
 
 
12:13 AM 
 
Date: 10/18/2018 Patient Name: Jennifer Horan History of Presenting Illness 80 y.o. male with noted past medical history including Prostate CA with an indwelling borges presents to the ED c/o bleeding around his borges catheter since earlier today. He says it is not painful, but he has noticed a bit of bleeding. He is not on any blood thinners. States he also had a little \"slide\" when he was trying to see what was going on with his cath but didn't injure himself at all. He does not recall the tubing being pulled at all. Denies fever, chills, penile pain, testicle pain, injury, or other symptoms at this time. No other complaints. Nursing notes regarding the HPI and triage nursing notes were reviewed. Prior medical records were reviewed. Current Facility-Administered Medications Medication Dose Route Frequency Provider Last Rate Last Dose  ciprofloxacin HCl (CIPRO) tablet 500 mg  500 mg Oral NOW DEB Canales Current Outpatient Medications Medication Sig Dispense Refill  ciprofloxacin HCl (CIPRO) 500 mg tablet Take 1 Tab by mouth two (2) times a day for 14 days. 28 Tab 0  
 acetaminophen (TYLENOL EXTRA STRENGTH) 500 mg tablet Take 2 Tabs by mouth every six (6) hours as needed for Pain. 50 Tab 0  
 calcium carbonate-vitamin D3 600 mg (1,500 mg)-800 unit chew Take 1 Tab by Mouth Once a Day.  cholecalciferol (VITAMIN D3) 1,000 unit tablet 1,000 Units.  multivitamins-minerals-lutein (MEN'S CENTRUM SILVER WITH LUTEIN) tab tablet Take 1 Tab by Mouth Once a Day.  aspirin delayed-release 81 mg tablet Take  by mouth daily.  ezetimibe (ZETIA) 10 mg tablet Take  by mouth. Past History Past Medical History: 
Past Medical History:  
Diagnosis Date  BPH (benign prostatic hyperplasia)   
 unspec, whether LUTS present  BPH with urinary obstruction  BPH without obstruction/lower urinary tract symptoms  Hormone refractory prostate cancer ([facility] [address]  Hypercholesteremia  Hypertrophy of prostate without urinary obstruction  Indwelling Borges catheter present  Lower urinary tract symptoms (LUTS)  Malignant neoplasm of prostate [facility])   
 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  Nocturia  Osseous metastasis ([facility] [address]  Pelvic lymphadenopathy  Personal history of colonic polyps  Urinary retention  Vitamin D deficiency Past Surgical History: 
Past Surgical History:  
Procedure Laterality Date  ENDOSCOPY, COLON, DIAGNOSTIC  2006/2011  HX APPENDECTOMY  HX CATARACT REMOVAL  03/03/2015  HX TONSILLECTOMY Family History: 
Family History Problem Relation Age of Onset  Hypertension Mother Social History: 
Social History Tobacco Use  Smoking status: Never Smoker  Smokeless tobacco: Never Used Substance Use Topics  Alcohol use: No  
 Drug use: No  
 
 
Allergies: Allergies Allergen Reactions  Alendronate Other (comments) Patient's primary care provider (as noted in EPIC):  Dee Leo NP Review of Systems Constitutional:  Denies malaise, fever, chills. Head:  Denies injury. Face:  Denies injury or pain. GI/ABD:  Denies injury, pain, distention, nausea, vomiting, diarrhea. :  + bleeding around borges catheter. Back:  Denies injury or pain. Pelvis:  Denies injury or pain. Extremity/MS:  Denies injury or pain. Neuro:  Denies headache, LOC, dizziness, neurologic symptoms/deficits/paresthesias. Skin: Denies injury, rash, itching or skin changes. All other systems negative as reviewed. Visit Vitals BP (!) 156/114 Temp 98.1 °F (36.7 °C) Resp 15 Ht 6' (1.829 m) Wt 78.9 kg (174 lb) SpO2 97% BMI 23.60 kg/m² PHYSICAL EXAM: 
 
 CONSTITUTIONAL:  Alert, in no apparent distress;  well developed;  well nourished. HEAD:  Normocephalic, atraumatic. EYES:  EOMI. Non-icteric sclera. Normal conjunctiva. ENTM:  Nose:  no rhinorrhea. Throat:  no erythema or exudate, mucous membranes moist. 
NECK:  Supple RESPIRATORY:  Chest clear, equal breath sounds, good air movement. CARDIOVASCULAR:  Regular rate and rhythm. No murmurs, rubs, or gallops. GI:  Normal bowel sounds, abdomen soft and non-tender. No rebound or guarding. : Minimal dried blood noted around borges catheter insertion at the meatus. BACK:  Non-tender. No CVAT. NEURO:  Moves all four extremities, and grossly normal motor exam. 
SKIN:  No rashes;  Normal for age. PSYCH:  Alert and normal affect. ED COURSE:   
 
Recent Results (from the past 12 hour(s)) URINALYSIS W/ RFLX MICROSCOPIC Collection Time: 10/18/18 11:20 PM  
Result Value Ref Range Color YELLOW Appearance CLOUDY Specific gravity 1.019 1.005 - 1.030    
 pH (UA) 6.0 5.0 - 8.0 Protein NEGATIVE  NEG mg/dL Glucose NEGATIVE  NEG mg/dL Ketone NEGATIVE  NEG mg/dL Bilirubin NEGATIVE  NEG Blood NEGATIVE  NEG Urobilinogen 0.2 0.2 - 1.0 EU/dL Nitrites POSITIVE (A) NEG Leukocyte Esterase LARGE (A) NEG    
CBC WITH AUTOMATED DIFF Collection Time: 10/18/18 11:27 PM  
Result Value Ref Range WBC 5.9 4.6 - 13.2 K/uL  
 RBC 4.76 4.70 - 5.50 M/uL  
 HGB 14.9 13.0 - 16.0 g/dL HCT 44.5 36.0 - 48.0 % MCV 93.5 74.0 - 97.0 FL  
 MCH 31.3 24.0 - 34.0 PG  
 MCHC 33.5 31.0 - 37.0 g/dL  
 RDW 13.4 11.6 - 14.5 % PLATELET 533 109 - 734 K/uL MPV 9.3 9.2 - 11.8 FL  
 NEUTROPHILS 66 40 - 73 % LYMPHOCYTES 22 21 - 52 % MONOCYTES 9 3 - 10 % EOSINOPHILS 2 0 - 5 % BASOPHILS 1 0 - 2 %  
 ABS. NEUTROPHILS 4.0 1.8 - 8.0 K/UL  
 ABS. LYMPHOCYTES 1.3 0.9 - 3.6 K/UL  
 ABS. MONOCYTES 0.5 0.05 - 1.2 K/UL  
 ABS. EOSINOPHILS 0.1 0.0 - 0.4 K/UL ABS. BASOPHILS 0.0 0.0 - 0.1 K/UL  
 DF AUTOMATED METABOLIC PANEL, COMPREHENSIVE Collection Time: 10/18/18 11:27 PM  
Result Value Ref Range Sodium 133 (L) 136 - 145 mmol/L Potassium 4.8 3.5 - 5.5 mmol/L Chloride 99 (L) 100 - 108 mmol/L  
 CO2 29 21 - 32 mmol/L Anion gap 5 3.0 - 18 mmol/L Glucose 110 (H) 74 - 99 mg/dL BUN 16 7.0 - 18 MG/DL Creatinine 0.74 0.6 - 1.3 MG/DL  
 BUN/Creatinine ratio 22 (H) 12 - 20 GFR est AA >60 >60 ml/min/1.73m2 GFR est non-AA >60 >60 ml/min/1.73m2 Calcium 9.3 8.5 - 10.1 MG/DL Bilirubin, total 0.4 0.2 - 1.0 MG/DL  
 ALT (SGPT) 60 16 - 61 U/L  
 AST (SGOT) 47 (H) 15 - 37 U/L Alk. phosphatase 86 45 - 117 U/L Protein, total 6.9 6.4 - 8.2 g/dL Albumin 3.6 3.4 - 5.0 g/dL Globulin 3.3 2.0 - 4.0 g/dL A-G Ratio 1.1 0.8 - 1.7 IMPRESSION AND MEDICAL DECISION MAKING: 
Based upon the patient's presentation with noted HPI and PE, along with the work up done in the emergency department, I believe that the patient is having a urinary tract infection. Will start cipro and have f/u with urology tomorrow. The dried blood was cleaned around the catheter and no more active bleeding is present. Diagnosis: 1. Urinary tract infection with hematuria, site unspecified 2. Garcia catheter problem, initial encounter ([facility] [address] Disposition: Discharge Follow-up Information Follow up With Specialties Details Why Contact Info Demetrio Chiang MD Urology In 1 day  [address] [place] [id number] [phone] [facility] EMERGENCY DEPT Emergency Medicine  If symptoms worsen [address] 
[phone] Medication List  
  
START taking these medications   
ciprofloxacin HCl 500 mg tablet Commonly known as:  CIPRO Take 1 Tab by mouth two (2) times a day for 14 days. ASK your doctor about these medications   
acetaminophen 500 mg tablet Commonly known as:  [address] 
 Take 2 Tabs by mouth every six (6) hours as needed for Pain. aspirin delayed-release 81 mg tablet 
  
calcium carbonate-vitamin D3 600 mg (1,500 mg)-800 unit Chew 
  
cholecalciferol 1,000 unit tablet Commonly known as:  VITAMIN D3 
  
multivitamins-minerals-lutein Tab tablet Commonly known as:  MEN'S CENTRUM SILVER WITH LUTEIN 
  
ZETIA 10 mg tablet Generic drug:  ezetimibe Where to Get Your Medications Information about where to get these medications is not yet available Ask your nurse or doctor about these medications · ciprofloxacin HCl 500 mg tablet DEB Reveles

## 2018-10-19 NOTE — ED NOTES
7:04 AM :Pt care assumed from Dr. Riley Hood, ED provider. Pt complaint(s), current treatment plan, progression and available diagnostic results have been discussed thoroughly. Rounding occurred: yes Intended Disposition: discharged Pending diagnostic reports and/or labs (please list): Waiting [facility] 
 
8:17 AM Urology saw and evaluated the patient, determined that patient is okay for discharge. The bleeding has stopped. Scribe Attestation Froylan Miranda acting as a scribe for and in the presence of Malik Knapp MD    
October 19, 2018 at 7:05 AM 
    
Provider Attestation:     
I personally performed the services described in the documentation, reviewed the documentation, as recorded by the scribe in my presence, and it accurately and completely records my words and actions.  October 19, 2018 at 7:05 AM - Malik Knapp MD

## 2018-10-22 LAB
BACTERIA SPEC CULT: ABNORMAL
SERVICE CMNT-IMP: ABNORMAL

## 2018-10-23 RX ORDER — CEPHALEXIN 500 MG/1
500 CAPSULE ORAL 3 TIMES DAILY
Qty: 21 CAP | Refills: 0 | Status: SHIPPED | OUTPATIENT
Start: 2018-10-23 | End: 2018-10-30

## 2018-10-23 NOTE — PROGRESS NOTES
Spoke with patient's daughter regarding urine culture results. Educated to stop cipro and start keflex due to sensitivity. Called in prescription to [address]. No further questions. .  Greater than 5 minutes discussion with daughter regarding

## 2019-10-21 NOTE — Clinical Note
Status[de-identified] Inpatient [101] Type of Bed: Telemetry [19] Inpatient Hospitalization Certified Necessary for the Following Reasons: 3. Patient receiving treatment that can only be provided in an inpatient setting (further clarification in H&P documentation) Admitting Diagnosis: Sepsis ([facility] [address] [[id number]] Admitting Physician: Mp Zepeda [[id number]] Attending Physician: Mp Zepeda [[id number]] Estimated Length of Stay: 3-4 Midnights Discharge Plan[de-identified] Home with Office Follow-up Exclude Pending Lab and Radiology orders from printing on the Patient's Discharge Instructions, due to Privacy Concerns.

## 2019-12-27 PROBLEM — N31.9 NEUROGENIC BLADDER: Status: ACTIVE | Noted: 2019-12-27

## 2019-12-27 PROBLEM — E55.9 VITAMIN D DEFICIENCY: Status: ACTIVE | Noted: 2019-12-27

## 2021-08-12 NOTE — PROGRESS NOTES
Urine culture positive. Sensitivity results still pending.       Bal Garcia PA-C Airway patent, Nasal mucosa clear. Mouth with normal mucosa. Throat has no vesicles, no oropharyngeal exudates and uvula is midline.